# Patient Record
Sex: FEMALE | Race: WHITE | NOT HISPANIC OR LATINO | Employment: FULL TIME | URBAN - METROPOLITAN AREA
[De-identification: names, ages, dates, MRNs, and addresses within clinical notes are randomized per-mention and may not be internally consistent; named-entity substitution may affect disease eponyms.]

---

## 2017-10-11 ENCOUNTER — OFFICE VISIT (OUTPATIENT)
Dept: OBSTETRICS AND GYNECOLOGY | Facility: CLINIC | Age: 22
End: 2017-10-11

## 2017-10-11 VITALS
BODY MASS INDEX: 17.85 KG/M2 | SYSTOLIC BLOOD PRESSURE: 100 MMHG | DIASTOLIC BLOOD PRESSURE: 70 MMHG | HEIGHT: 62 IN | WEIGHT: 97 LBS

## 2017-10-11 DIAGNOSIS — Z01.419 ENCOUNTER FOR GYNECOLOGICAL EXAMINATION WITHOUT ABNORMAL FINDING: Primary | ICD-10-CM

## 2017-10-11 DIAGNOSIS — Z30.41 ENCOUNTER FOR SURVEILLANCE OF CONTRACEPTIVE PILLS: ICD-10-CM

## 2017-10-11 PROCEDURE — 99385 PREV VISIT NEW AGE 18-39: CPT | Performed by: OBSTETRICS & GYNECOLOGY

## 2017-10-11 RX ORDER — SPIRONOLACTONE 50 MG/1
TABLET, FILM COATED ORAL
COMMUNITY
Start: 2017-09-19 | End: 2021-01-13

## 2017-10-11 RX ORDER — CEPHALEXIN 500 MG/1
CAPSULE ORAL
COMMUNITY
Start: 2017-09-18 | End: 2020-07-06

## 2017-10-11 NOTE — PROGRESS NOTES
Subjective     Veronica Welsh is a 22 y.o. female for annual gyn exam    History of Present Illness    22-year-old nulliparous white female presents for her first gynecologic exam.  She is a new patient to this office.  Her menstrual cycles are regular and well controlled with oral contraceptives.  She has been on the birth control pill for approximately 4 years.  She has had some issues with postcoital UTIs and now takes prophylactic antibiotics after intercourse.  She is also noticed some decreased lubrication and libido.  There is no family history of breast cancer.  She has been in good health.      The following portions of the patient's history were reviewed and updated as appropriate: allergies, current medications, past family history, past medical history, past social history, past surgical history and problem list.      Review of Systems   Constitutional: Negative for activity change, appetite change, fatigue and unexpected weight change.   HENT: Negative.    Eyes: Negative.    Respiratory: Negative.    Cardiovascular: Negative.    Gastrointestinal: Negative for abdominal distention, abdominal pain, anal bleeding, constipation, diarrhea, nausea and vomiting.   Endocrine: Negative for cold intolerance, heat intolerance, polydipsia, polyphagia and polyuria.   Genitourinary: Negative for difficulty urinating, dysuria, flank pain, frequency, hematuria and urgency.   Musculoskeletal: Negative.    Skin: Negative.    Allergic/Immunologic: Negative.    Neurological: Negative.    Hematological: Negative.    Psychiatric/Behavioral: Negative.        Objective     Physical Exam   Constitutional: She is oriented to person, place, and time. Vital signs are normal. She appears well-developed and well-nourished. She is cooperative.   HENT:   Head: Normocephalic.   Eyes: Conjunctivae are normal. Pupils are equal, round, and reactive to light.   Neck: Normal range of motion. Neck supple. No tracheal deviation present. No  thyromegaly present.   Cardiovascular: Normal rate, regular rhythm and normal heart sounds.  Exam reveals no gallop and no friction rub.    No murmur heard.  Pulmonary/Chest: Effort normal and breath sounds normal. No respiratory distress. She has no wheezes. Right breast exhibits no inverted nipple, no mass, no nipple discharge, no skin change and no tenderness. Left breast exhibits no inverted nipple, no mass, no nipple discharge, no skin change and no tenderness. Breasts are symmetrical. There is no breast swelling.   Abdominal: Soft. Bowel sounds are normal. She exhibits no distension, no ascites and no mass. There is no hepatosplenomegaly. There is no tenderness. There is no rebound, no guarding and no CVA tenderness. No hernia. Hernia confirmed negative in the right inguinal area and confirmed negative in the left inguinal area.   Genitourinary: Pelvic exam was performed with patient supine. No labial fusion. There is no rash, tenderness, lesion or injury on the right labia. There is no rash, tenderness, lesion or injury on the left labia. Uterus is not deviated, not enlarged, not fixed and not tender. Cervix exhibits no motion tenderness, no discharge and no friability. Right adnexum displays no mass, no tenderness and no fullness. Left adnexum displays no mass, no tenderness and no fullness. No erythema, tenderness or bleeding in the vagina. No foreign body in the vagina. No signs of injury around the vagina. No vaginal discharge found.   Genitourinary Comments: Rectal exam deferred.   Musculoskeletal: Normal range of motion. She exhibits no edema or deformity.   Lymphadenopathy:     She has no cervical adenopathy.        Right: No inguinal adenopathy present.        Left: No inguinal adenopathy present.   Neurological: She is alert and oriented to person, place, and time. She has normal reflexes. No cranial nerve deficit. Coordination normal.   Skin: Skin is warm and dry. No rash noted. No erythema.    Psychiatric: She has a normal mood and affect. Her behavior is normal.         Veronica was seen today for gynecologic exam.    Diagnoses and all orders for this visit:    Encounter for gynecological examination without abnormal finding  -     Pap IG, Ct-Ng, Rfx HPV ASCU - ThinPrep Vial, Cervix    Encounter for surveillance of contraceptive pills  -     norethindrone-ethinyl estradiol (PIRMELLA 1/35) 1-35 MG-MCG per tablet; Take 1 tablet by mouth Daily.    The patient was reassured with her normal exam.  We discussed libido and oral contraceptive pills and I offered a trial of a different pill.  The patient will consider this.  We discussed a healthy diet and regular exercise.  He will continue post coital methods to prevent recurrent urinary tract infections.  Annual exams were recommended.

## 2017-10-16 LAB
C TRACH RRNA CVX QL NAA+PROBE: NEGATIVE
CONV .: NORMAL
CYTOLOGIST CVX/VAG CYTO: NORMAL
CYTOLOGY CVX/VAG DOC THIN PREP: NORMAL
DX ICD CODE: NORMAL
HIV 1 & 2 AB SER-IMP: NORMAL
N GONORRHOEA RRNA CVX QL NAA+PROBE: NEGATIVE
OTHER STN SPEC: NORMAL
PATH REPORT.FINAL DX SPEC: NORMAL
STAT OF ADQ CVX/VAG CYTO-IMP: NORMAL

## 2018-01-31 ENCOUNTER — TREATMENT (OUTPATIENT)
Dept: PHYSICAL THERAPY | Facility: CLINIC | Age: 23
End: 2018-01-31

## 2018-01-31 DIAGNOSIS — Z02.1 PRE-EMPLOYMENT EXAMINATION: Primary | ICD-10-CM

## 2018-01-31 PROCEDURE — PDS: Performed by: PHYSICAL THERAPIST

## 2018-03-02 ENCOUNTER — OFFICE VISIT (OUTPATIENT)
Dept: OBSTETRICS AND GYNECOLOGY | Facility: CLINIC | Age: 23
End: 2018-03-02

## 2018-03-02 VITALS
HEIGHT: 62 IN | SYSTOLIC BLOOD PRESSURE: 100 MMHG | WEIGHT: 95 LBS | BODY MASS INDEX: 17.48 KG/M2 | DIASTOLIC BLOOD PRESSURE: 70 MMHG

## 2018-03-02 DIAGNOSIS — Z30.09 ENCOUNTER FOR OTHER GENERAL COUNSELING OR ADVICE ON CONTRACEPTION: Primary | ICD-10-CM

## 2018-03-02 PROCEDURE — 99213 OFFICE O/P EST LOW 20 MIN: CPT | Performed by: OBSTETRICS & GYNECOLOGY

## 2018-03-02 NOTE — PROGRESS NOTES
Subjective   Veronica Welsh is a 22 y.o. female here for contraceptive counseling     History of Present Illness   22-year-old nulliparous white female currently on oral contraceptives is interested in alternative forms of birth control.  The following portions of the patient's history were reviewed and updated as appropriate: allergies, current medications, past family history, past medical history, past social history, past surgical history and problem list.    Review of Systems   Genitourinary: Negative for menstrual problem, pelvic pain and vaginal bleeding.       Objective   Physical Exam   The patient is alert and conversant and in no acute distress.  Blood pressure was 100/70.  Pulse 72 and regular    Assessment/Plan   Veronica was seen today for gynecologic exam.    Diagnoses and all orders for this visit:    Encounter for other general counseling or advice on contraception     We discussed contraceptive options.  She is most interested in the Nexplanon implant or the NuvaRing.  She is not interested in Depo-Provera or the IUD.  Alternative forms of taking the oral contraceptive pill were also discussed.  Information booklets were given on the Nexplanon and the NuvaRing.  She will call with her choice.  Total visit time 20 minutes with greater than 50% spent in counseling regarding contraception and safe sex practices.

## 2020-06-22 ENCOUNTER — TELEPHONE (OUTPATIENT)
Dept: OBSTETRICS AND GYNECOLOGY | Facility: CLINIC | Age: 25
End: 2020-06-22

## 2020-06-22 NOTE — TELEPHONE ENCOUNTER
Good Afternoon,    Patient is wanting to switch care from Dr. Villaseñor to a different provider. Would you be okay with taking her on as a patient?     Please advise, thank you.

## 2020-06-23 NOTE — TELEPHONE ENCOUNTER
Patient is aware that switching providers is OK, and was put on schedule to be seen my Arline BAIG

## 2020-07-06 ENCOUNTER — OFFICE VISIT (OUTPATIENT)
Dept: OBSTETRICS AND GYNECOLOGY | Facility: CLINIC | Age: 25
End: 2020-07-06

## 2020-07-06 VITALS
HEIGHT: 63 IN | BODY MASS INDEX: 18.25 KG/M2 | WEIGHT: 103 LBS | DIASTOLIC BLOOD PRESSURE: 60 MMHG | SYSTOLIC BLOOD PRESSURE: 100 MMHG

## 2020-07-06 DIAGNOSIS — Z31.69 ENCOUNTER FOR PRECONCEPTION CONSULTATION: ICD-10-CM

## 2020-07-06 DIAGNOSIS — Z01.419 ENCOUNTER FOR ANNUAL ROUTINE GYNECOLOGICAL EXAMINATION: Primary | ICD-10-CM

## 2020-07-06 PROCEDURE — 99395 PREV VISIT EST AGE 18-39: CPT | Performed by: NURSE PRACTITIONER

## 2020-07-06 NOTE — PROGRESS NOTES
GYN Annual Exam     Chief Complaint   Patient presents with   • Gynecologic Exam     Last pap 10/12/17 negative.       HPI    Veronica Welsh is a 25 y.o. female who presents for annual well woman exam.  She currently sexually active. Periods are regular every 35 days, lasting 7 days. Dysmenorrhea:moderate, occurring first 1-2 days of flow. Cyclic symptoms include bloating, moodiness and cramping. No intermenstrual bleeding, spotting, or discharge. Performing SBE:yes    C/o irregularity since stopping OCP, she is getting  in 3 months and considering trying to concieve. She was experiencing vaginal dryness and decreased libido while taking OCP. PCP had switched to a different birth control with no improvement in sx. She stopped taking approx 8 months ago, symptoms have resolved since stopping.      Taking spironolactone to treat acne     This is my first time meeting Veronica Welsh      OB History        0    Para   0    Term   0       0    AB   0    Living   0       SAB   0    TAB   0    Ectopic   0    Molar   0    Multiple   0    Live Births   0                LMP-20  Last Pap--negative  History of abnormal Pap smear: no  History of STD-denies  Family history of uterine, colon or ovarian cancer: no  Family history of breast cancer: no  Mammogram-never  History of abnormal mammogram: no  Gardasil Vaccine: yes    Past Medical History:   Diagnosis Date   • Acne        Past Surgical History:   Procedure Laterality Date   • TONSILLECTOMY           Current Outpatient Medications:   •  norethindrone-ethinyl estradiol (PIRMELLA 1/35) 1-35 MG-MCG per tablet, Take 1 tablet by mouth Daily., Disp: 28 tablet, Rfl: 11  •  spironolactone (ALDACTONE) 50 MG tablet, , Disp: , Rfl:     Allergies   Allergen Reactions   • Amoxicillin Hives       Social History     Tobacco Use   • Smoking status: Never Smoker   • Smokeless tobacco: Never Used   Substance Use Topics   • Alcohol use: No   • Drug use: No  "      Family History   Problem Relation Age of Onset   • No Known Problems Father    • No Known Problems Mother        Review of Systems   Constitutional: Negative for chills, fatigue and fever.   Gastrointestinal: Negative for abdominal distention, abdominal pain, constipation and diarrhea.   Genitourinary: Positive for pelvic pain (mild, new onset). Negative for breast discharge, breast lump, breast pain, dyspareunia, pelvic pressure, vaginal bleeding, vaginal discharge and vaginal pain.   Musculoskeletal: Negative for gait problem.   Neurological: Negative for dizziness and headache.   Psychiatric/Behavioral: Negative for depressed mood.       /60   Ht 160 cm (63\")   Wt 46.7 kg (103 lb)   LMP 06/21/2020   BMI 18.25 kg/m²     Physical Exam   Constitutional: She is oriented to person, place, and time. She appears well-developed and well-nourished.   HENT:   Head: Normocephalic and atraumatic.   Eyes: Pupils are equal, round, and reactive to light. EOM are normal. Right eye exhibits no discharge.   Neck: Normal range of motion. Neck supple. No thyromegaly present.   Cardiovascular: Normal rate, regular rhythm and normal heart sounds.   No murmur heard.  Pulmonary/Chest: Effort normal and breath sounds normal. No respiratory distress. She has no wheezes. Right breast exhibits no inverted nipple, no mass, no nipple discharge, no skin change and no tenderness. Left breast exhibits no inverted nipple, no mass, no nipple discharge, no skin change and no tenderness. No breast swelling, tenderness, discharge or bleeding. Breasts are symmetrical.   Abdominal: Soft. She exhibits no distension and no mass. There is no tenderness. There is no rebound and no guarding. No hernia. Hernia confirmed negative in the right inguinal area and confirmed negative in the left inguinal area.   Genitourinary: Uterus normal. No labial fusion. There is no rash, tenderness, lesion or injury on the right labia. There is no rash, " tenderness, lesion or injury on the left labia. Cervix exhibits no motion tenderness, no discharge and no friability. Right adnexum displays no mass, no tenderness and no fullness. Left adnexum displays no mass, no tenderness and no fullness. No erythema, tenderness or bleeding in the vagina. No foreign body in the vagina. No signs of injury around the vagina. No vaginal discharge found.   Musculoskeletal: Normal range of motion. She exhibits no edema or deformity.   Lymphadenopathy:     She has no cervical adenopathy. No inguinal adenopathy noted on the right or left side.   Neurological: She is alert and oriented to person, place, and time. No cranial nerve deficit. Coordination normal.   Skin: Skin is warm and dry. No erythema.   Psychiatric: She has a normal mood and affect. Her behavior is normal. Judgment and thought content normal.   Nursing note and vitals reviewed.         Assessment     1. Annual Gyn Exam  2. Preconception counseling     Plan   1. Well woman exam: Pap collected Yes. Recommend MVI daily.    2. Contraception: Declines  3. STD: Enc condoms. Desires STD screen today- Yes. NuSwab  4. Smoking status: nonsmoker  5. Patient's Body mass index is 18.25 kg/m². BMI is within normal parameters. No follow-up required..  6.  Encouraged PNV and folic acid daily, d/c spironolactone when begins to try to conceive.    7. Encouraged annual mammogram screening starting at age 40. Instructed on how to perform SBE. Encouraged breast health self awareness.  8. Encouraged 150 minutes of exercise per week if not medially contraindicated.     Follow Up one year or PRN    SAFIA Amador  7/6/2020  10:49

## 2020-07-09 LAB
CONV .: NORMAL
CYTOLOGIST CVX/VAG CYTO: NORMAL
CYTOLOGY CVX/VAG DOC CYTO: NORMAL
CYTOLOGY CVX/VAG DOC THIN PREP: NORMAL
DX ICD CODE: NORMAL
HIV 1 & 2 AB SER-IMP: NORMAL
Lab: NORMAL
OTHER STN SPEC: NORMAL
STAT OF ADQ CVX/VAG CYTO-IMP: NORMAL

## 2020-07-14 LAB
A VAGINAE DNA VAG QL NAA+PROBE: NORMAL SCORE
BVAB2 DNA VAG QL NAA+PROBE: NORMAL SCORE
C ALBICANS DNA VAG QL NAA+PROBE: NEGATIVE
C GLABRATA DNA VAG QL NAA+PROBE: NEGATIVE
C TRACH DNA VAG QL NAA+PROBE: NEGATIVE
MEGA1 DNA VAG QL NAA+PROBE: NORMAL SCORE
N GONORRHOEA DNA VAG QL NAA+PROBE: NEGATIVE
T VAGINALIS DNA VAG QL NAA+PROBE: NEGATIVE

## 2021-01-13 ENCOUNTER — OFFICE VISIT (OUTPATIENT)
Dept: FAMILY MEDICINE CLINIC | Facility: CLINIC | Age: 26
End: 2021-01-13

## 2021-01-13 VITALS
SYSTOLIC BLOOD PRESSURE: 123 MMHG | OXYGEN SATURATION: 100 % | HEIGHT: 63 IN | HEART RATE: 98 BPM | DIASTOLIC BLOOD PRESSURE: 84 MMHG | BODY MASS INDEX: 19.21 KG/M2 | WEIGHT: 108.4 LBS | TEMPERATURE: 97.8 F

## 2021-01-13 DIAGNOSIS — N93.9 ABNORMAL UTERINE BLEEDING: Primary | ICD-10-CM

## 2021-01-13 LAB
B-HCG UR QL: NEGATIVE
INTERNAL NEGATIVE CONTROL: NEGATIVE
INTERNAL POSITIVE CONTROL: NORMAL
Lab: NORMAL

## 2021-01-13 PROCEDURE — 99213 OFFICE O/P EST LOW 20 MIN: CPT | Performed by: NURSE PRACTITIONER

## 2021-01-13 PROCEDURE — 81025 URINE PREGNANCY TEST: CPT | Performed by: NURSE PRACTITIONER

## 2021-01-13 NOTE — PROGRESS NOTES
"Chief Complaint  Establish Care (GYN does pap smear) and Menstrual Problem  I have seen this patient at [facility] ([facility]   Subjective          Veronica Welsh presents to [facility] for   History of Present Illness  Abnormal uterine bleeding: Patient complains of irregular menstrual cycles starting more than 3 months ago. Patient stopped combined oral contraceptive due to side effect of low libido. Patient is currently sexually active.  Objective   Vital Signs:   /84   Pulse 98   Temp 97.8 °F (36.6 °C) (Temporal)   Ht 160 cm (63\")   Wt 49.2 kg (108 lb 6.4 oz)   SpO2 100%   BMI 19.20 kg/m²     Physical Exam  Vitals signs and nursing note reviewed.   Constitutional:       Appearance: Normal appearance.   Cardiovascular:      Rate and Rhythm: Normal rate and regular rhythm.   Pulmonary:      Effort: Pulmonary effort is normal.      Breath sounds: Normal breath sounds.   Abdominal:      General: Bowel sounds are normal.      Palpations: Abdomen is soft.   Neurological:      Mental Status: She is alert and oriented to person, place, and time.   Psychiatric:         Mood and Affect: Mood normal.        Result Review :   The following data was reviewed by: SAFIA Garcia on 01/13/2021:  Urine pregnancy test           Assessment and Plan    Problem List Items Addressed This Visit     None      Visit Diagnoses     Abnormal uterine bleeding    -  Primary    - If labs are normal, then may consider transvaginal US for further evaluation.     Relevant Orders    POCT pregnancy, urine (Completed)    TSH Rfx On Abnormal To Free T4    Prolactin        I spent 15 minutes caring for Veronica on this date of service. This time includes time spent by me in the following activities:reviewing tests, performing a medically appropriate examination and/or evaluation , counseling and educating the patient/family/caregiver, ordering medications, tests, or procedures " and documenting information in the medical record  Follow Up   No follow-ups on file.  Patient was given instructions and counseling regarding her condition or for health maintenance advice. Please see specific information pulled into the AVS if appropriate.

## 2021-01-14 ENCOUNTER — TELEPHONE (OUTPATIENT)
Dept: FAMILY MEDICINE CLINIC | Facility: CLINIC | Age: 26
End: 2021-01-14

## 2021-01-14 DIAGNOSIS — N93.9 ABNORMAL UTERINE BLEEDING: Primary | ICD-10-CM

## 2021-01-14 LAB
PROLACTIN SERPL-MCNC: 21.8 NG/ML (ref 4.8–23.3)
TSH SERPL DL<=0.005 MIU/L-ACNC: 0.96 UIU/ML (ref 0.27–4.2)

## 2021-01-14 NOTE — TELEPHONE ENCOUNTER
TSH and prolactin are normal so would recommend transvaginal US at this time.    Patient aware of results and recommendations.

## 2021-01-28 DIAGNOSIS — N93.9 ABNORMAL UTERINE BLEEDING: Primary | ICD-10-CM

## 2021-01-29 ENCOUNTER — HOSPITAL ENCOUNTER (OUTPATIENT)
Dept: ULTRASOUND IMAGING | Facility: HOSPITAL | Age: 26
End: 2021-01-29

## 2021-01-29 ENCOUNTER — HOSPITAL ENCOUNTER (OUTPATIENT)
Dept: ULTRASOUND IMAGING | Facility: HOSPITAL | Age: 26
Discharge: HOME | End: 2021-01-29
Admitting: NURSE PRACTITIONER

## 2021-01-29 ENCOUNTER — TELEPHONE (OUTPATIENT)
Dept: FAMILY MEDICINE CLINIC | Facility: CLINIC | Age: 26
End: 2021-01-29

## 2021-01-29 DIAGNOSIS — N93.9 ABNORMAL UTERINE BLEEDING: ICD-10-CM

## 2021-01-29 DIAGNOSIS — N93.9 ABNORMAL UTERINE BLEEDING: Primary | ICD-10-CM

## 2021-01-29 PROCEDURE — 76856 US EXAM PELVIC COMPLETE: CPT

## 2021-01-29 PROCEDURE — 76830 TRANSVAGINAL US NON-OB: CPT

## 2021-01-29 NOTE — TELEPHONE ENCOUNTER
[facility] called and ask that you also put in the following order  U/S Pelvis Complete.

## 2021-02-01 ENCOUNTER — TELEPHONE (OUTPATIENT)
Dept: FAMILY MEDICINE CLINIC | Facility: CLINIC | Age: 26
End: 2021-02-01

## 2021-02-01 NOTE — TELEPHONE ENCOUNTER
Patient wants to know if there is any additional testing or is this it, the problem hasn't corrected & she wants to know if this is just something she has to live with

## 2021-02-01 NOTE — TELEPHONE ENCOUNTER
----- Message from SAFIA Garcia sent at 2/1/2021  8:55 AM EST -----  Please inform patient that pelvic US is normal.        LMTCB    ** HUB MAY RELAY MESSAGE**

## 2021-02-02 NOTE — TELEPHONE ENCOUNTER
Patient aware of normal US results. Patient does not wish to restart birth control because she is trying to conceive. Patient will follow-up with gynecology as needed.

## 2021-04-12 ENCOUNTER — OFFICE VISIT (OUTPATIENT)
Dept: FAMILY MEDICINE CLINIC | Facility: CLINIC | Age: 26
End: 2021-04-12

## 2021-04-12 VITALS
HEIGHT: 63 IN | DIASTOLIC BLOOD PRESSURE: 82 MMHG | HEART RATE: 96 BPM | SYSTOLIC BLOOD PRESSURE: 110 MMHG | BODY MASS INDEX: 18.89 KG/M2 | OXYGEN SATURATION: 97 % | WEIGHT: 106.6 LBS | TEMPERATURE: 98.2 F

## 2021-04-12 DIAGNOSIS — R19.7 DIARRHEA OF PRESUMED INFECTIOUS ORIGIN: Primary | ICD-10-CM

## 2021-04-12 PROBLEM — J30.9 ALLERGIC RHINITIS: Status: ACTIVE | Noted: 2018-02-20

## 2021-04-12 PROCEDURE — 99213 OFFICE O/P EST LOW 20 MIN: CPT | Performed by: FAMILY MEDICINE

## 2021-04-12 RX ORDER — OMEPRAZOLE 40 MG/1
40 CAPSULE, DELAYED RELEASE ORAL DAILY
Qty: 30 CAPSULE | Refills: 0 | Status: SHIPPED | OUTPATIENT
Start: 2021-04-12 | End: 2021-05-18

## 2021-04-12 RX ORDER — ONDANSETRON 4 MG/1
4 TABLET, FILM COATED ORAL EVERY 8 HOURS PRN
Qty: 30 TABLET | Refills: 0 | Status: SHIPPED | OUTPATIENT
Start: 2021-04-12 | End: 2021-05-18

## 2021-04-12 NOTE — PROGRESS NOTES
"Subjective   Veronica Welsh is a 25 y.o. female.     Chief Complaint   Patient presents with   • Diarrhea     since Friday   • Heartburn       History of Present Illness   Patient has had diarrhea and heartburn for 3 days.  Diarrhea has been nonbloody.  She had her first Covid shot 1 week prior to this. Has had heartburn before. No emesis but some nausea. Eating makes her nauseated. Spicy foods make it worse. Not taking any otc meds.     The following portions of the patient's history were reviewed and updated as appropriate: allergies, current medications, past family history, past medical history, past social history, past surgical history and problem list.    Past Medical History:   Diagnosis Date   • Acne        Past Surgical History:   Procedure Laterality Date   • TONSILLECTOMY         Family History   Problem Relation Age of Onset   • No Known Problems Father    • Hyperlipidemia Mother    • Hyperlipidemia Maternal Grandfather        Social History     Socioeconomic History   • Marital status: Single     Spouse name: Not on file   • Number of children: Not on file   • Years of education: Not on file   • Highest education level: Not on file   Tobacco Use   • Smoking status: Never Smoker   • Smokeless tobacco: Never Used   Substance and Sexual Activity   • Alcohol use: No   • Drug use: No   • Sexual activity: Yes     Birth control/protection: OCP       Review of Systems   Constitutional: Negative for fever.       Objective   Visit Vitals  /82 (BP Location: Right arm, Patient Position: Sitting)   Pulse 96   Temp 98.2 °F (36.8 °C)   Ht 160 cm (62.99\")   Wt 48.4 kg (106 lb 9.6 oz)   SpO2 97%   BMI 18.89 kg/m²     Body mass index is 18.89 kg/m².  Physical Exam  Constitutional:       Appearance: Normal appearance. She is well-developed.   Cardiovascular:      Rate and Rhythm: Normal rate and regular rhythm.      Heart sounds: Normal heart sounds.   Pulmonary:      Effort: Pulmonary effort is normal.      Breath " sounds: Normal breath sounds.   Abdominal:      General: Abdomen is flat. Bowel sounds are normal. There is no distension.      Palpations: There is no mass.      Tenderness: There is no abdominal tenderness. There is no guarding or rebound.   Musculoskeletal:         General: No swelling. Normal range of motion.   Skin:     General: Skin is warm and dry.      Findings: No rash.   Neurological:      General: No focal deficit present.      Mental Status: She is alert and oriented to person, place, and time.   Psychiatric:         Mood and Affect: Mood normal.         Behavior: Behavior normal.           Assessment/Plan   Diagnoses and all orders for this visit:    1. Diarrhea of presumed infectious origin (Primary)  -     CBC & Differential  -     Comprehensive Metabolic Panel  -     COVID-19,[facility] ROUTINE, NP/OP SWAB IN TRANSPORT MEDIA OR ESWAB 72 HR TAT - Swab, Nasopharynx  -     TSH Rfx On Abnormal To Free T4  -     omeprazole (priLOSEC) 40 MG capsule; Take 1 capsule by mouth Daily.  Dispense: 30 capsule; Refill: 0  -     ondansetron (Zofran) 4 MG tablet; Take 1 tablet by mouth Every 8 (Eight) Hours As Needed for Nausea or Vomiting.  Dispense: 30 tablet; Refill: 0        Rest, fluids and follow up if worse or no better.

## 2021-04-13 LAB
ALBUMIN SERPL-MCNC: 5.2 G/DL (ref 3.5–5.2)
ALBUMIN/GLOB SERPL: 2.1 G/DL
ALP SERPL-CCNC: 93 U/L (ref 39–117)
ALT SERPL-CCNC: 18 U/L (ref 1–33)
AST SERPL-CCNC: 21 U/L (ref 1–32)
BASOPHILS # BLD AUTO: 0.06 10*3/MM3 (ref 0–0.2)
BASOPHILS NFR BLD AUTO: 0.6 % (ref 0–1.5)
BILIRUB SERPL-MCNC: 0.6 MG/DL (ref 0–1.2)
BUN SERPL-MCNC: 11 MG/DL (ref 6–20)
BUN/CREAT SERPL: 14.5 (ref 7–25)
CALCIUM SERPL-MCNC: 10.5 MG/DL (ref 8.6–10.5)
CHLORIDE SERPL-SCNC: 103 MMOL/L (ref 98–107)
CO2 SERPL-SCNC: 26.7 MMOL/L (ref 22–29)
CREAT SERPL-MCNC: 0.76 MG/DL (ref 0.57–1)
EOSINOPHIL # BLD AUTO: 0.03 10*3/MM3 (ref 0–0.4)
EOSINOPHIL NFR BLD AUTO: 0.3 % (ref 0.3–6.2)
ERYTHROCYTE [DISTWIDTH] IN BLOOD BY AUTOMATED COUNT: 12.7 % (ref 12.3–15.4)
GLOBULIN SER CALC-MCNC: 2.5 GM/DL
GLUCOSE SERPL-MCNC: 89 MG/DL (ref 65–99)
HCT VFR BLD AUTO: 41.8 % (ref 34–46.6)
HGB BLD-MCNC: 13.8 G/DL (ref 12–15.9)
IMM GRANULOCYTES # BLD AUTO: 0.03 10*3/MM3 (ref 0–0.05)
IMM GRANULOCYTES NFR BLD AUTO: 0.3 % (ref 0–0.5)
LABCORP SARS-COV-2, NAA 2 DAY TAT: NORMAL
LYMPHOCYTES # BLD AUTO: 1.93 10*3/MM3 (ref 0.7–3.1)
LYMPHOCYTES NFR BLD AUTO: 18.7 % (ref 19.6–45.3)
MCH RBC QN AUTO: 28.3 PG (ref 26.6–33)
MCHC RBC AUTO-ENTMCNC: 33 G/DL (ref 31.5–35.7)
MCV RBC AUTO: 85.8 FL (ref 79–97)
MONOCYTES # BLD AUTO: 0.68 10*3/MM3 (ref 0.1–0.9)
MONOCYTES NFR BLD AUTO: 6.6 % (ref 5–12)
NEUTROPHILS # BLD AUTO: 7.57 10*3/MM3 (ref 1.7–7)
NEUTROPHILS NFR BLD AUTO: 73.5 % (ref 42.7–76)
NRBC BLD AUTO-RTO: 0 /100 WBC (ref 0–0.2)
PLATELET # BLD AUTO: 346 10*3/MM3 (ref 140–450)
POTASSIUM SERPL-SCNC: 4.1 MMOL/L (ref 3.5–5.2)
PROT SERPL-MCNC: 7.7 G/DL (ref 6–8.5)
RBC # BLD AUTO: 4.87 10*6/MM3 (ref 3.77–5.28)
SARS-COV-2 RNA RESP QL NAA+PROBE: NOT DETECTED
SODIUM SERPL-SCNC: 140 MMOL/L (ref 136–145)
TSH SERPL DL<=0.005 MIU/L-ACNC: 0.69 UIU/ML (ref 0.27–4.2)
WBC # BLD AUTO: 10.3 10*3/MM3 (ref 3.4–10.8)

## 2021-04-14 ENCOUNTER — TELEPHONE (OUTPATIENT)
Dept: FAMILY MEDICINE CLINIC | Facility: CLINIC | Age: 26
End: 2021-04-14

## 2021-04-14 NOTE — TELEPHONE ENCOUNTER
----- Message from Amber Peterson MD sent at 4/14/2021  8:41 AM EDT -----  Please let her know that her Covid test was negative.

## 2021-04-19 ENCOUNTER — TELEPHONE (OUTPATIENT)
Dept: FAMILY MEDICINE CLINIC | Facility: CLINIC | Age: 26
End: 2021-04-19

## 2021-04-19 NOTE — TELEPHONE ENCOUNTER
The following patient called and stated that she did receive her covid results however she did not receive the results of her blood work.

## 2021-04-19 NOTE — TELEPHONE ENCOUNTER
The following patient called and is requesting to be started on birth control pills. Pt states that she discuss this with you a couple of visits ago.

## 2021-04-20 RX ORDER — NORETHINDRONE AND ETHINYL ESTRADIOL 1 MG-35MCG
1 KIT ORAL DAILY
Qty: 84 TABLET | Refills: 3 | Status: SHIPPED | OUTPATIENT
Start: 2021-04-20 | End: 2022-09-07

## 2021-06-07 ENCOUNTER — OFFICE VISIT (OUTPATIENT)
Dept: FAMILY MEDICINE CLINIC | Facility: CLINIC | Age: 26
End: 2021-06-07

## 2021-06-07 VITALS
WEIGHT: 104 LBS | SYSTOLIC BLOOD PRESSURE: 123 MMHG | OXYGEN SATURATION: 96 % | TEMPERATURE: 97.8 F | HEART RATE: 88 BPM | HEIGHT: 63 IN | BODY MASS INDEX: 18.43 KG/M2 | DIASTOLIC BLOOD PRESSURE: 77 MMHG

## 2021-06-07 DIAGNOSIS — Z20.2 STD EXPOSURE: ICD-10-CM

## 2021-06-07 DIAGNOSIS — N30.01 ACUTE CYSTITIS WITH HEMATURIA: Primary | ICD-10-CM

## 2021-06-07 LAB
BILIRUB BLD-MCNC: NEGATIVE MG/DL
CLARITY, POC: CLEAR
COLOR UR: ABNORMAL
GLUCOSE UR STRIP-MCNC: NEGATIVE MG/DL
KETONES UR QL: ABNORMAL
LEUKOCYTE EST, POC: ABNORMAL
NITRITE UR-MCNC: NEGATIVE MG/ML
PH UR: 8 [PH] (ref 5–8)
PROT UR STRIP-MCNC: ABNORMAL MG/DL
RBC # UR STRIP: ABNORMAL /UL
SP GR UR: 1.01 (ref 1–1.03)
UROBILINOGEN UR QL: NORMAL

## 2021-06-07 PROCEDURE — 99213 OFFICE O/P EST LOW 20 MIN: CPT | Performed by: FAMILY MEDICINE

## 2021-06-07 RX ORDER — SULFAMETHOXAZOLE AND TRIMETHOPRIM 800; 160 MG/1; MG/1
1 TABLET ORAL 2 TIMES DAILY
Qty: 10 TABLET | Refills: 0 | Status: SHIPPED | OUTPATIENT
Start: 2021-06-07 | End: 2021-08-26

## 2021-06-07 NOTE — PROGRESS NOTES
Subjective   Veronica Welsh is a 26 y.o. female.     Chief Complaint   Patient presents with   • urgency to urinate     x 1 day   • discomfort and burning when urinating     x 1 day       History of Present Illness     Patient is complaining on pain with urination and frequency of urination and suprapubic pain.  She has a history of recurrent UTIs.    The following portions of the patient's history were reviewed and updated as appropriate: allergies, current medications, past family history, past medical history, past social history, past surgical history and problem list.    Past Medical History:   Diagnosis Date   • Acne        Past Surgical History:   Procedure Laterality Date   • TONSILLECTOMY         Family History   Problem Relation Age of Onset   • No Known Problems Father    • Hyperlipidemia Mother    • Hyperlipidemia Maternal Grandfather        Social History     Socioeconomic History   • Marital status: Single     Spouse name: Not on file   • Number of children: Not on file   • Years of education: Not on file   • Highest education level: Not on file   Tobacco Use   • Smoking status: Never Smoker   • Smokeless tobacco: Never Used   Substance and Sexual Activity   • Alcohol use: No   • Drug use: No   • Sexual activity: Yes     Birth control/protection: OCP       Current Outpatient Medications on File Prior to Visit   Medication Sig Dispense Refill   • norethindrone-ethinyl estradiol (Pirmella 1/35) 1-35 MG-MCG per tablet Take 1 tablet by mouth Daily. 84 tablet 3   • [DISCONTINUED] azithromycin (ZITHROMAX) 250 MG tablet 2 tablets today, then 1 tablet daily 6 tablet 0   • [DISCONTINUED] sulfamethoxazole-trimethoprim (Bactrim DS) 800-160 MG per tablet Take 1 tablet by mouth 2 (Two) Times a Day. 14 tablet 0     No current facility-administered medications on file prior to visit.       Review of Systems   Constitutional: Negative.        Recent Results (from the past 4704 hour(s))   POCT pregnancy, urine     Collection Time: 01/13/21 11:31 AM    Specimen: Urine   Result Value Ref Range    HCG, Urine, QL Negative Negative    Lot Number [id number]     Internal Positive Control Not Performed     Internal Negative Control Negative    TSH Rfx On Abnormal To Free T4    Collection Time: 01/13/21 12:01 PM    Specimen: Blood   Result Value Ref Range    TSH 0.961 0.270 - 4.200 uIU/mL   Prolactin    Collection Time: 01/13/21 12:01 PM    Specimen: Blood   Result Value Ref Range    Prolactin 21.8 4.8 - 23.3 ng/mL   CBC & Differential    Collection Time: 04/12/21  1:29 PM    Specimen: Blood   Result Value Ref Range    WBC 10.30 3.40 - 10.80 10*3/mm3    RBC 4.87 3.77 - 5.28 10*6/mm3    Hemoglobin 13.8 12.0 - 15.9 g/dL    Hematocrit 41.8 34.0 - 46.6 %    MCV 85.8 79.0 - 97.0 fL    MCH 28.3 26.6 - 33.0 pg    MCHC 33.0 31.5 - 35.7 g/dL    RDW 12.7 12.3 - 15.4 %    Platelets 346 140 - 450 10*3/mm3    Neutrophil Rel % 73.5 42.7 - 76.0 %    Lymphocyte Rel % 18.7 (L) 19.6 - 45.3 %    Monocyte Rel % 6.6 5.0 - 12.0 %    Eosinophil Rel % 0.3 0.3 - 6.2 %    Basophil Rel % 0.6 0.0 - 1.5 %    Neutrophils Absolute 7.57 (H) 1.70 - 7.00 10*3/mm3    Lymphocytes Absolute 1.93 0.70 - 3.10 10*3/mm3    Monocytes Absolute 0.68 0.10 - 0.90 10*3/mm3    Eosinophils Absolute 0.03 0.00 - 0.40 10*3/mm3    Basophils Absolute 0.06 0.00 - 0.20 10*3/mm3    Immature Granulocyte Rel % 0.3 0.0 - 0.5 %    Immature Grans Absolute 0.03 0.00 - 0.05 10*3/mm3    nRBC 0.0 0.0 - 0.2 /100 WBC   Comprehensive Metabolic Panel    Collection Time: 04/12/21  1:29 PM    Specimen: Blood   Result Value Ref Range    Glucose 89 65 - 99 mg/dL    BUN 11 6 - 20 mg/dL    Creatinine 0.76 0.57 - 1.00 mg/dL    eGFR Non African Am 93 >60 mL/min/1.73    eGFR African Am 113 >60 mL/min/1.73    BUN/Creatinine Ratio 14.5 7.0 - 25.0    Sodium 140 136 - 145 mmol/L    Potassium 4.1 3.5 - 5.2 mmol/L    Chloride 103 98 - 107 mmol/L    Total CO2 26.7 22.0 - 29.0 mmol/L    Calcium 10.5 8.6 - 10.5 mg/dL     Total Protein 7.7 6.0 - 8.5 g/dL    Albumin 5.20 3.50 - 5.20 g/dL    Globulin 2.5 gm/dL    A/G Ratio 2.1 g/dL    Total Bilirubin 0.6 0.0 - 1.2 mg/dL    Alkaline Phosphatase 93 39 - 117 U/L    AST (SGOT) 21 1 - 32 U/L    ALT (SGPT) 18 1 - 33 U/L   TSH Rfx On Abnormal To Free T4    Collection Time: 04/12/21  1:29 PM    Specimen: Blood   Result Value Ref Range    TSH 0.692 0.270 - 4.200 uIU/mL   COVID-19,[facility] ROUTINE, NP/OP SWAB IN TRANSPORT MEDIA OR ESWAB 72 HR TAT - Swab, Nasopharynx    Collection Time: 04/12/21  3:09 PM    Specimen: Nasopharynx; Swab   Result Value Ref Range    SARS-CoV-2, [facility]INE Not Detected Not Detected   SARS-CoV-2, CHRISTINE 2 DAY TAT - ,    Collection Time: 04/12/21  3:09 PM   Result Value Ref Range    [facility] SARS-COV-2, CHRISTINE 2 DAY TAT Performed    POC Urinalysis Dipstick, Multipro (Automated dipstick)    Collection Time: 05/18/21 12:49 PM    Specimen: Urine   Result Value Ref Range    Color Red (A) Yellow, Straw, Dark Yellow, Anabel    Clarity, UA Cloudy (A) Clear    Glucose,  mg/dL (A) Negative, 1000 mg/dL (3+) mg/dL    Bilirubin Moderate (2+) (A) Negative    Ketones, UA 15 mg/dL (A) Negative    Specific Gravity  1.010 1.005 - 1.030    Blood, UA Large (A) Negative    pH, Urine 5.0 5.0 - 8.0    Protein,  mg/dL (A) Negative mg/dL    Urobilinogen, UA 1 E.U./dL  (A) Normal    Nitrite, UA Positive (A) Negative    Leukocytes Large (3+) (A) Negative   Urine Culture - Urine, Urine, Clean Catch    Collection Time: 05/18/21 12:51 PM    Specimen: Urine, Clean Catch   Result Value Ref Range    Urine Culture Final report (A)     Result 1 Escherichia coli (A)     Result 2 Comment (A)     Susceptibility Testing Comment    POCT urinalysis dipstick, automated    Collection Time: 06/07/21  2:10 PM    Specimen: Urine   Result Value Ref Range    Color Straw Yellow, Straw, Dark Yellow, Anabel    Clarity, UA Clear Clear    Specific Gravity  1.015 1.005 - 1.030    pH, Urine 8.0 5.0 - 8.0    Leukocytes Trace  "(A) Negative    Nitrite, UA Negative Negative    Protein, POC Trace (A) Negative mg/dL    Glucose, UA Negative Negative, 1000 mg/dL (3+) mg/dL    Ketones, UA Trace (A) Negative    Urobilinogen, UA Normal Normal    Bilirubin Negative Negative    Blood, UA 3+ (A) Negative     Objective   Vitals:    06/07/21 1403   BP: 123/77   Pulse: 88   Temp: 97.8 °F (36.6 °C)   SpO2: 96%   Weight: 47.2 kg (104 lb)   Height: 160 cm (62.99\")     Body mass index is 18.43 kg/m².  Physical Exam  Vitals and nursing note reviewed.   Constitutional:       General: She is not in acute distress.     Appearance: She is well-developed. She is not diaphoretic.   Cardiovascular:      Rate and Rhythm: Normal rate and regular rhythm.   Pulmonary:      Effort: Pulmonary effort is normal. No respiratory distress.      Breath sounds: Normal breath sounds. No wheezing.   Abdominal:      Comments: S/p pain, no CVA pain           Diagnoses and all orders for this visit:    1. Acute cystitis with hematuria (Primary)  -     POCT urinalysis dipstick, automated  -     sulfamethoxazole-trimethoprim (Bactrim DS) 800-160 MG per tablet; Take 1 tablet by mouth 2 (Two) Times a Day.  Dispense: 10 tablet; Refill: 0    2. STD exposure  -     RPR  -     HSV 1 & 2 - Specific Antibody, IgG  -     HIV-1 / O / 2 Ag / Antibody 4th Generation  -     Hepatitis B & C Profile  -     Chlamydia trachomatis, Neisseria gonorrhoeae, Trichomonas vaginalis, PCR - Urine, Urine, Clean Catch    Return if symptoms worsen or fail to improve.            "

## 2021-06-09 LAB
C TRACH RRNA SPEC QL NAA+PROBE: NEGATIVE
HBV CORE AB SERPL QL IA: NEGATIVE
HBV CORE IGM SERPL QL IA: NEGATIVE
HBV E AB SERPL QL IA: NEGATIVE
HBV E AG SERPL QL IA: NEGATIVE
HBV SURFACE AB SER QL: REACTIVE
HBV SURFACE AG SERPL QL IA: NEGATIVE
HCV AB S/CO SERPL IA: <0.1 S/CO RATIO (ref 0–0.9)
HIV 1+2 AB+HIV1 P24 AG SERPL QL IA: NON REACTIVE
HSV1 IGG SER IA-ACNC: <0.91 INDEX (ref 0–0.9)
HSV2 IGG SER IA-ACNC: <0.91 INDEX (ref 0–0.9)
LABORATORY COMMENT REPORT: NORMAL
N GONORRHOEA RRNA SPEC QL NAA+PROBE: NEGATIVE
RPR SER QL: NORMAL
T VAGINALIS DNA SPEC QL NAA+PROBE: NEGATIVE

## 2021-08-26 ENCOUNTER — OFFICE VISIT (OUTPATIENT)
Dept: FAMILY MEDICINE CLINIC | Facility: CLINIC | Age: 26
End: 2021-08-26

## 2021-08-26 ENCOUNTER — TELEPHONE (OUTPATIENT)
Dept: FAMILY MEDICINE CLINIC | Facility: CLINIC | Age: 26
End: 2021-08-26

## 2021-08-26 VITALS
HEART RATE: 88 BPM | BODY MASS INDEX: 18.29 KG/M2 | WEIGHT: 103.2 LBS | OXYGEN SATURATION: 98 % | TEMPERATURE: 97.3 F | SYSTOLIC BLOOD PRESSURE: 118 MMHG | DIASTOLIC BLOOD PRESSURE: 82 MMHG | HEIGHT: 63 IN

## 2021-08-26 DIAGNOSIS — R35.0 FREQUENCY OF URINATION: ICD-10-CM

## 2021-08-26 DIAGNOSIS — Z00.00 WELL FEMALE EXAM WITHOUT GYNECOLOGICAL EXAM: Primary | ICD-10-CM

## 2021-08-26 LAB
B-HCG UR QL: NEGATIVE
BILIRUB BLD-MCNC: NEGATIVE MG/DL
CLARITY, POC: CLEAR
COLOR UR: YELLOW
GLUCOSE UR STRIP-MCNC: NEGATIVE MG/DL
INTERNAL NEGATIVE CONTROL: NORMAL
INTERNAL POSITIVE CONTROL: NORMAL
KETONES UR QL: NEGATIVE
LEUKOCYTE EST, POC: NEGATIVE
Lab: NORMAL
NITRITE UR-MCNC: NEGATIVE MG/ML
PH UR: 6 [PH] (ref 5–8)
PROT UR STRIP-MCNC: NEGATIVE MG/DL
RBC # UR STRIP: NEGATIVE /UL
SP GR UR: 1.01 (ref 1–1.03)
UROBILINOGEN UR QL: NORMAL

## 2021-08-26 PROCEDURE — 81003 URINALYSIS AUTO W/O SCOPE: CPT | Performed by: NURSE PRACTITIONER

## 2021-08-26 PROCEDURE — 81025 URINE PREGNANCY TEST: CPT | Performed by: NURSE PRACTITIONER

## 2021-08-26 PROCEDURE — 99395 PREV VISIT EST AGE 18-39: CPT | Performed by: NURSE PRACTITIONER

## 2021-08-26 RX ORDER — SPIRONOLACTONE 50 MG/1
50 TABLET, FILM COATED ORAL DAILY
COMMUNITY
Start: 2021-07-19 | End: 2022-09-07

## 2021-08-26 NOTE — TELEPHONE ENCOUNTER
Called, unable to leave message due to voice mail full. Will try again later.      **HUB** may give message should patient call back.      ----- Message from SAFIA Garcia sent at 8/26/2021  3:28 PM EDT -----  Please inform patient that urinalysis is negative for infection so will refer to urology at this time.

## 2022-09-07 ENCOUNTER — OFFICE VISIT (OUTPATIENT)
Dept: FAMILY MEDICINE CLINIC | Facility: CLINIC | Age: 27
End: 2022-09-07

## 2022-09-07 VITALS
OXYGEN SATURATION: 98 % | BODY MASS INDEX: 20.48 KG/M2 | WEIGHT: 115.6 LBS | DIASTOLIC BLOOD PRESSURE: 80 MMHG | TEMPERATURE: 98.2 F | SYSTOLIC BLOOD PRESSURE: 102 MMHG | HEART RATE: 62 BPM

## 2022-09-07 DIAGNOSIS — Z00.00 WELL FEMALE EXAM WITHOUT GYNECOLOGICAL EXAM: Primary | ICD-10-CM

## 2022-09-07 DIAGNOSIS — N64.9 LESION OF LEFT NIPPLE: ICD-10-CM

## 2022-09-07 DIAGNOSIS — Z23 NEED FOR HPV VACCINATION: ICD-10-CM

## 2022-09-07 DIAGNOSIS — Z23 NEED FOR PNEUMOCOCCAL VACCINATION: ICD-10-CM

## 2022-09-07 PROCEDURE — 99395 PREV VISIT EST AGE 18-39: CPT | Performed by: NURSE PRACTITIONER

## 2022-09-07 PROCEDURE — 90677 PCV20 VACCINE IM: CPT | Performed by: NURSE PRACTITIONER

## 2022-09-07 PROCEDURE — 90471 IMMUNIZATION ADMIN: CPT | Performed by: NURSE PRACTITIONER

## 2022-09-07 PROCEDURE — 3008F BODY MASS INDEX DOCD: CPT | Performed by: NURSE PRACTITIONER

## 2022-09-07 PROCEDURE — 2014F MENTAL STATUS ASSESS: CPT | Performed by: NURSE PRACTITIONER

## 2022-09-07 PROCEDURE — 90651 9VHPV VACCINE 2/3 DOSE IM: CPT | Performed by: NURSE PRACTITIONER

## 2022-09-07 NOTE — PROGRESS NOTES
Subjective   Veronica Welsh is a 27 y.o. female.     Chief Complaint   Patient presents with   • bump on nipple on left breast for past month, no pain   • Annual Exam       History of Present Illness   The patient is being seen for a health maintenance evaluation.  The last health maintenance visit was last year.  Social history: Household members include boyfriend.  She is unmarried.  Work status: Full-time.  The patient has never smoked cigarettes.  She reports occasional alcohol use.  She has never used illicit drugs.  General health: The patient's health is described as good.  She has regular dental visits.  The patient brushes 2 times a day, does not floss and reports her last dental visit was less than a year ago.  She denies vision problems.  Vision care includes no need for vision correction and no recent eye exam.  She denies hearing loss.  Immunization status: Pneumococcal, Tdap and HPV vaccines needed.  Lifestyle: She does not exercise regularly.  Reproductive health: She is sexually active.  Screening: A normal Pap was performed on 7/6/2020.    The following portions of the patient's history were reviewed and updated as appropriate: allergies, current medications, past family history, past medical history, past social history, past surgical history and problem list.    Past Medical History:   Diagnosis Date   • Acne        Past Surgical History:   Procedure Laterality Date   • TONSILLECTOMY         Family History   Problem Relation Age of Onset   • No Known Problems Father    • Hyperlipidemia Mother    • Hyperlipidemia Maternal Grandfather        Social History     Socioeconomic History   • Marital status: Single   Tobacco Use   • Smoking status: Never Smoker   • Smokeless tobacco: Never Used   Substance and Sexual Activity   • Alcohol use: No   • Drug use: No   • Sexual activity: Yes     Birth control/protection: OCP       Review of Systems   Constitutional: Negative for fever.   HENT: Negative for ear  pain, rhinorrhea and sore throat.    Eyes: Negative for visual disturbance.   Respiratory: Negative for cough and shortness of breath.    Cardiovascular: Negative for chest pain.   Gastrointestinal: Negative for abdominal pain, diarrhea, nausea and vomiting.   Genitourinary: Negative for breast discharge and breast pain.        Lesion on left nipple   Musculoskeletal: Negative.    Skin: Negative for rash.   Neurological: Negative for dizziness and headache.   Psychiatric/Behavioral: Negative for depressed mood.       Objective   Vitals:    09/07/22 0914   BP: 102/80   BP Location: Right arm   Patient Position: Sitting   Cuff Size: Adult   Pulse: 62   Temp: 98.2 °F (36.8 °C)   TempSrc: Temporal   SpO2: 98%   Weight: 52.4 kg (115 lb 9.6 oz)      Body mass index is 20.48 kg/m².  Physical Exam  Vitals and nursing note reviewed.   Constitutional:       Appearance: Normal appearance.   HENT:      Head: Normocephalic and atraumatic.      Right Ear: Tympanic membrane and ear canal normal.      Left Ear: Tympanic membrane and ear canal normal.   Eyes:      Pupils: Pupils are equal, round, and reactive to light.   Cardiovascular:      Rate and Rhythm: Normal rate and regular rhythm.      Heart sounds: Normal heart sounds.   Pulmonary:      Effort: Pulmonary effort is normal.      Breath sounds: Normal breath sounds.   Chest:      Comments: Flesh colored lesion of left nipple without tenderness or drainage   Abdominal:      General: Bowel sounds are normal.      Palpations: Abdomen is soft.      Tenderness: There is no abdominal tenderness.   Genitourinary:     Comments: Deferred   Musculoskeletal:         General: Normal range of motion.      Cervical back: Neck supple.   Skin:     General: Skin is warm and dry.   Neurological:      Mental Status: She is alert and oriented to person, place, and time.   Psychiatric:         Mood and Affect: Mood normal.           Assessment & Plan   Diagnoses and all orders for this  visit:    1. Well female exam without gynecological exam (Primary)    2. Lesion of left nipple  -     US Breast Left Complete; Future    3. Need for pneumococcal vaccination  -     Pneumococcal Conjugate Vaccine 20-Valent (PCV20)    4. Need for HPV vaccination  -     HPV Vaccine (HPV9)    Impression: Currently, she has an inadequate exercise regimen.  Cervical cancer screening is current.  No screening lab work is due at this time.  Pneumococcal and HPV vaccination given today.  She was advised to be evaluated by a dentist.  Advice and education were given regarding aerobic exercise.

## 2022-09-21 ENCOUNTER — HOSPITAL ENCOUNTER (OUTPATIENT)
Dept: ULTRASOUND IMAGING | Facility: HOSPITAL | Age: 27
Discharge: HOME | End: 2022-09-21
Admitting: NURSE PRACTITIONER

## 2022-09-21 DIAGNOSIS — N64.9 LESION OF LEFT NIPPLE: ICD-10-CM

## 2022-09-21 PROCEDURE — 76642 ULTRASOUND BREAST LIMITED: CPT

## 2022-09-30 ENCOUNTER — TELEPHONE (OUTPATIENT)
Dept: FAMILY MEDICINE CLINIC | Facility: CLINIC | Age: 27
End: 2022-09-30

## 2022-10-12 ENCOUNTER — CLINICAL SUPPORT (OUTPATIENT)
Dept: FAMILY MEDICINE CLINIC | Facility: CLINIC | Age: 27
End: 2022-10-12

## 2022-10-12 DIAGNOSIS — Z23 NEED FOR HPV VACCINATION: Primary | ICD-10-CM

## 2022-10-12 PROCEDURE — 90471 IMMUNIZATION ADMIN: CPT | Performed by: NURSE PRACTITIONER

## 2022-10-12 PROCEDURE — 90651 9VHPV VACCINE 2/3 DOSE IM: CPT | Performed by: NURSE PRACTITIONER

## 2023-01-26 ENCOUNTER — OFFICE VISIT (OUTPATIENT)
Dept: OBSTETRICS AND GYNECOLOGY | Facility: CLINIC | Age: 28
End: 2023-01-26
Payer: COMMERCIAL

## 2023-01-26 VITALS
BODY MASS INDEX: 20.68 KG/M2 | SYSTOLIC BLOOD PRESSURE: 100 MMHG | HEIGHT: 62 IN | HEART RATE: 72 BPM | WEIGHT: 112.4 LBS | DIASTOLIC BLOOD PRESSURE: 72 MMHG

## 2023-01-26 DIAGNOSIS — N91.2 AMENORRHEA: Primary | ICD-10-CM

## 2023-01-26 DIAGNOSIS — R39.198 DIFFICULTY VOIDING: ICD-10-CM

## 2023-01-26 LAB
BILIRUB BLD-MCNC: NEGATIVE MG/DL
GLUCOSE UR STRIP-MCNC: NEGATIVE MG/DL
KETONES UR QL: NEGATIVE
LEUKOCYTE EST, POC: NEGATIVE
NITRITE UR-MCNC: NEGATIVE MG/ML
PH UR: 5 [PH] (ref 5–8)
PROT UR STRIP-MCNC: NEGATIVE MG/DL
RBC # UR STRIP: NEGATIVE /UL
SP GR UR: 1.02 (ref 1–1.03)
UROBILINOGEN UR QL: NORMAL

## 2023-01-26 PROCEDURE — 99215 OFFICE O/P EST HI 40 MIN: CPT | Performed by: NURSE PRACTITIONER

## 2023-01-26 PROCEDURE — 81002 URINALYSIS NONAUTO W/O SCOPE: CPT | Performed by: NURSE PRACTITIONER

## 2023-01-26 RX ORDER — MEDROXYPROGESTERONE ACETATE 10 MG/1
10 TABLET ORAL DAILY
Qty: 10 TABLET | Refills: 0 | Status: SHIPPED | OUTPATIENT
Start: 2023-01-26 | End: 2023-02-05

## 2023-01-26 NOTE — PROGRESS NOTES
"Chief Complaint   Patient presents with   • Follow-up     Gyn pt c/o no cycles since 2022        SUBJECTIVE:     Veronica Welsh is a 27 y.o.  who presents with c/o amenorrhea since 2022. She has had negative home pregnancy tests. This is not a new problem, but is the first time I am seeing her for this issue. She is having some premenstrual symptoms. C/o breast tenderness, uterine cramping intermittently. She has hx irregular menses with normal work up in . Last work up was 2 years ago. She has treated with JANA in the past, however reports negative side effects such as decreased libido and vaginal dryness. She is not interested in contraceptives to manage.     Past Medical History:   Diagnosis Date   • Acne       Past Surgical History:   Procedure Laterality Date   • TONSILLECTOMY          Review of Systems   Constitutional: Negative for chills, fatigue and fever.   Gastrointestinal: Negative for abdominal distention and abdominal pain.   Genitourinary: Positive for menstrual problem. Negative for dysuria, frequency, vaginal bleeding, vaginal discharge and vaginal pain.        + amenorrhea       OBJECTIVE:   Vitals:    23 0908   BP: 100/72   Pulse: 72   Weight: 51 kg (112 lb 6.4 oz)   Height: 157.5 cm (62\")        Physical Exam  Constitutional:       General: She is not in acute distress.     Appearance: Normal appearance. She is not ill-appearing, toxic-appearing or diaphoretic.   Genitourinary:      Bladder and urethral meatus normal.      No lesions in the vagina.      Right Labia: No rash, tenderness, lesions, skin changes or Bartholin's cyst.     Left Labia: No tenderness, lesions, skin changes, Bartholin's cyst or rash.     No labial fusion noted.      No inguinal adenopathy present in the right or left side.     No vaginal discharge, erythema, tenderness, bleeding, ulceration or granulation tissue.      No vaginal prolapse present.     No vaginal atrophy present.       Right Adnexa: " not tender, not full, not palpable, no mass present and not absent.     Left Adnexa: not tender, not full, not palpable, no mass present and not absent.     No cervical motion tenderness, discharge, friability, lesion, polyp, nabothian cyst or eversion.      Uterus is not enlarged, fixed, tender, irregular or prolapsed.      No uterine mass detected.  Cardiovascular:      Rate and Rhythm: Normal rate.   Pulmonary:      Effort: Pulmonary effort is normal.   Abdominal:      General: There is no distension.      Palpations: Abdomen is soft. There is no mass.      Tenderness: There is no abdominal tenderness. There is no guarding.      Hernia: No hernia is present. There is no hernia in the left inguinal area or right inguinal area.   Musculoskeletal:      Cervical back: Normal range of motion.   Lymphadenopathy:      Lower Body: No right inguinal adenopathy. No left inguinal adenopathy.   Neurological:      General: No focal deficit present.      Mental Status: She is alert and oriented to person, place, and time.      Cranial Nerves: No cranial nerve deficit.   Skin:     General: Skin is warm and dry.   Psychiatric:         Mood and Affect: Mood normal.         Behavior: Behavior normal.         Thought Content: Thought content normal.         Judgment: Judgment normal.   Vitals and nursing note reviewed.       Assessment/Plan    Diagnoses and all orders for this visit:    1. Amenorrhea (Primary)  -     CBC & Differential  -     DHEA-Sulfate  -     Hemoglobin A1c  -     Prolactin  -     TSH  -     Testosterone  -     Follicle Stimulating Hormone  -     Cancel: US Non-ob Transvaginal; Future  -     medroxyPROGESTERone (Provera) 10 MG tablet; Take 1 tablet by mouth Daily for 10 days.  Dispense: 10 tablet; Refill: 0    2. Difficulty voiding  -     Urine Culture - Urine, Urine, Clean Catch  -     Ambulatory Referral to Gynecologic Urology    Recommend repeating work up given last was 2 years ago  She agrees to repeat work  up today. Will check labs and u/s  She is not interested in contraceptives. Discussed IUD  Discussed provera to induce menses, she is interested in trying. We reviewed uses and side effects  Unable to leave urine sample, advised she should be reasonable certain she is not pregnancy before starting provera.   Declines STD testing  Due for AE, encouraged to schedule  TVUS normal, however bladder is distended. She reports hx of frequent UTI and does not void very often during the day. She has seen urology in the past, but prefers female provider, discussed referral to uro/gyn to further evaluate. She was eventually able to void prior to leaving the office. Discussed bladder training, voiding on a schedule. She is drinking appropriate amounts of water daily.     Follow up: 2-3 weeks for f/u and to review labs and u/s    I spent 45 minutes caring for Vreonica on this date of service. This time includes time spent by me in the following activities: preparing for the visit, reviewing tests, obtaining and/or reviewing a separately obtained history, performing a medically appropriate examination and/or evaluation, counseling and educating the patient/family/caregiver, ordering medications, tests, or procedures, referring and communicating with other health care professionals and documenting information in the medical record    Arline Lowe, APRN  1/26/2023  12:13 EST

## 2023-01-28 LAB
BACTERIA UR CULT: NO GROWTH
BACTERIA UR CULT: NORMAL

## 2023-02-03 ENCOUNTER — TELEPHONE (OUTPATIENT)
Dept: OBSTETRICS AND GYNECOLOGY | Facility: CLINIC | Age: 28
End: 2023-02-03
Payer: COMMERCIAL

## 2023-02-13 ENCOUNTER — TELEPHONE (OUTPATIENT)
Dept: OBSTETRICS AND GYNECOLOGY | Facility: CLINIC | Age: 28
End: 2023-02-13
Payer: COMMERCIAL

## 2023-02-13 NOTE — TELEPHONE ENCOUNTER
She finished medication prescribed to help start cycle on Thursday. Has not started. Was wondering how long it might take to work? Pt Ph [phone]

## 2023-02-13 NOTE — TELEPHONE ENCOUNTER
Typically we see results once the medication is completed. I did order some lab work to further evaluate her absence of periods, but it doesn't appear the labs were drawn yet. If she can return to the office before her next appt to have these collected we will have them back in time for her follow up appointment and can review them at that time. Thank you

## 2023-02-15 LAB
BASOPHILS # BLD AUTO: 0.06 10*3/MM3 (ref 0–0.2)
BASOPHILS NFR BLD AUTO: 0.9 % (ref 0–1.5)
EOSINOPHIL # BLD AUTO: 0.29 10*3/MM3 (ref 0–0.4)
EOSINOPHIL NFR BLD AUTO: 4.6 % (ref 0.3–6.2)
ERYTHROCYTE [DISTWIDTH] IN BLOOD BY AUTOMATED COUNT: 13 % (ref 12.3–15.4)
HCT VFR BLD AUTO: 38.9 % (ref 34–46.6)
HGB BLD-MCNC: 12.7 G/DL (ref 12–15.9)
IMM GRANULOCYTES # BLD AUTO: 0.01 10*3/MM3 (ref 0–0.05)
IMM GRANULOCYTES NFR BLD AUTO: 0.2 % (ref 0–0.5)
LYMPHOCYTES # BLD AUTO: 2.99 10*3/MM3 (ref 0.7–3.1)
LYMPHOCYTES NFR BLD AUTO: 46.9 % (ref 19.6–45.3)
MCH RBC QN AUTO: 28.2 PG (ref 26.6–33)
MCHC RBC AUTO-ENTMCNC: 32.6 G/DL (ref 31.5–35.7)
MCV RBC AUTO: 86.4 FL (ref 79–97)
MONOCYTES # BLD AUTO: 0.49 10*3/MM3 (ref 0.1–0.9)
MONOCYTES NFR BLD AUTO: 7.7 % (ref 5–12)
NEUTROPHILS # BLD AUTO: 2.53 10*3/MM3 (ref 1.7–7)
NEUTROPHILS NFR BLD AUTO: 39.7 % (ref 42.7–76)
NRBC BLD AUTO-RTO: 0 /100 WBC (ref 0–0.2)
PLATELET # BLD AUTO: 283 10*3/MM3 (ref 140–450)
RBC # BLD AUTO: 4.5 10*6/MM3 (ref 3.77–5.28)
TSH SERPL DL<=0.005 MIU/L-ACNC: 1.3 UIU/ML (ref 0.27–4.2)
WBC # BLD AUTO: 6.37 10*3/MM3 (ref 3.4–10.8)

## 2023-02-16 LAB
DHEA-S SERPL-MCNC: 180 UG/DL (ref 84.8–378)
FSH SERPL-ACNC: 7.9 MIU/ML
HBA1C MFR BLD: 4.9 % (ref 4.8–5.6)
PROLACTIN SERPL-MCNC: 41.3 NG/ML (ref 4.8–23.3)
TESTOST SERPL-MCNC: 32 NG/DL (ref 13–71)

## 2023-02-23 ENCOUNTER — OFFICE VISIT (OUTPATIENT)
Dept: OBSTETRICS AND GYNECOLOGY | Facility: CLINIC | Age: 28
End: 2023-02-23
Payer: COMMERCIAL

## 2023-02-23 VITALS
BODY MASS INDEX: 20.06 KG/M2 | DIASTOLIC BLOOD PRESSURE: 69 MMHG | WEIGHT: 109 LBS | HEIGHT: 62 IN | SYSTOLIC BLOOD PRESSURE: 99 MMHG

## 2023-02-23 DIAGNOSIS — R79.89 ELEVATED PROLACTIN LEVEL: ICD-10-CM

## 2023-02-23 DIAGNOSIS — N91.2 AMENORRHEA: Primary | ICD-10-CM

## 2023-02-23 PROCEDURE — 99213 OFFICE O/P EST LOW 20 MIN: CPT | Performed by: NURSE PRACTITIONER

## 2023-02-23 NOTE — PROGRESS NOTES
"Chief Complaint   Patient presents with   • Follow-up     F/u on medication and labs drawn         SUBJECTIVE:     Veronica Welsh is a 27 y.o.  who presents to f/u on amenorrhea and labs. Reports menses started 23, she is currently on her cycle. She was given provera to induce menses, states she took this late because she became ill and was not sure if the medication would worsen her symptoms. States she has urology f/u May 2023. Reports bladder has been fine.     Past Medical History:   Diagnosis Date   • Acne       Past Surgical History:   Procedure Laterality Date   • TONSILLECTOMY          OBJECTIVE:   Vitals:    23 0910   BP: 99/69   Weight: 49.4 kg (109 lb)   Height: 157.5 cm (62\")        Assessment/Plan    Diagnoses and all orders for this visit:    1. Amenorrhea (Primary)    2. Elevated prolactin level  -     Prolactin    Reviewed lab results with pt, mostly normal range with the exception of prolactin which was slightly elevated.   Repeating today she is fasting. Discussed referral to Endocrinology if repeat is elevated  Normal TVUS reviewed with pt ET thin at 0.3, discussed this as a contributing factor. She reports she has been underweight most of her life. She does not exercise routinely. BMI 19.94  Will call if no menses for 3 months for refill of provera.   She is not interested in contraceptives at this time   Discussed ovulation tracking when she is planning for pregnancy    Return in about 3 months (around 2023), or if symptoms worsen or fail to improve.    I spent 21 minutes caring for Veronica on this date of service. This time includes time spent by me in the following activities: preparing for the visit, reviewing tests, obtaining and/or reviewing a separately obtained history, performing a medically appropriate examination and/or evaluation, counseling and educating the patient/family/caregiver, ordering medications, tests, or procedures, referring and communicating with " other health care professionals and documenting information in the medical record    Arline Lowe, APRN  2/23/2023  12:34 EST

## 2023-02-24 DIAGNOSIS — R79.89 ELEVATED PROLACTIN LEVEL: Primary | ICD-10-CM

## 2023-02-24 LAB — PROLACTIN SERPL-MCNC: 40.4 NG/ML (ref 4.8–23.3)

## 2023-02-24 NOTE — PROGRESS NOTES
Please let the pt know that her repeat prolactin level continues to be elevated. When we see this we often will refer to endocrinology for further evaluation. I have entered a referral for this. She should here from us in approx one week for scheduling. Thank you

## 2023-05-02 ENCOUNTER — OFFICE VISIT (OUTPATIENT)
Dept: ENDOCRINOLOGY | Age: 28
End: 2023-05-02
Payer: COMMERCIAL

## 2023-05-02 VITALS
HEART RATE: 63 BPM | HEIGHT: 62 IN | TEMPERATURE: 97.5 F | SYSTOLIC BLOOD PRESSURE: 112 MMHG | BODY MASS INDEX: 21.12 KG/M2 | OXYGEN SATURATION: 97 % | DIASTOLIC BLOOD PRESSURE: 70 MMHG | WEIGHT: 114.8 LBS

## 2023-05-02 DIAGNOSIS — N91.2 AMENORRHEA: ICD-10-CM

## 2023-05-02 DIAGNOSIS — E22.1 HYPERPROLACTINEMIA: Primary | ICD-10-CM

## 2023-05-02 NOTE — PROGRESS NOTES
Referring provider: SAFIA Amador     Reason for consult:  Hyperprolactinemia    HPI:   - 27 year old female here for hyperprolactinemia  - She states her prolactin levels were checked due to amenorrhea  - She states that she never had a menstrual cycle until she was started on OCP's at age 18  - She was on OCP's until about 2 years ago when she stopped them and her menstrual cycles never returned until she was given progesterone which caused a menstrual cycle then in 2/2023 she had a menstrual cycle without any medications  - She states she was less than 80 pounds until she was about 21  - She has gained weight since OCP's were discontinued  - She has daily headaches  - Denies galactorrhea  - She does not use illegal drugs including marijuana    The following portions of the patient's history were reviewed and updated as appropriate: allergies, current medications, past family history, past medical history, past social history, past surgical history and problem list.    Review of Systems   Genitourinary: Positive for amenorrhea.       Objective     Vitals:    05/02/23 1257   BP: 112/70   Pulse: 63   Temp: 97.5 °F (36.4 °C)   SpO2: 97%        Physical Exam  Constitutional:       Appearance: Normal appearance. She is obese.   Eyes:      General: No scleral icterus.  Pulmonary:      Effort: Pulmonary effort is normal. No respiratory distress.   Neurological:      Mental Status: She is alert.      Gait: Gait normal.   Psychiatric:         Mood and Affect: Mood normal.         Behavior: Behavior normal.         Thought Content: Thought content normal.         Judgment: Judgment normal.       Labs/Imaging:  Reviewed labs showing a prolactin of 40 and then 41 in 2/2023, she also had a normal TSH in 2/2023    Assessment & Plan   1. Hyperprolactinemia  - Will order MRI pituitary to rule out stalk effect    2. Amenorrhea  - Possibly related to relatively low body weight    - Return appt. Will be based on lab  results

## 2023-06-04 ENCOUNTER — HOSPITAL ENCOUNTER (OUTPATIENT)
Dept: MRI IMAGING | Facility: HOSPITAL | Age: 28
Discharge: HOME | End: 2023-06-04
Admitting: INTERNAL MEDICINE
Payer: COMMERCIAL

## 2023-06-04 DIAGNOSIS — E22.1 HYPERPROLACTINEMIA: ICD-10-CM

## 2023-06-04 PROCEDURE — 70553 MRI BRAIN STEM W/O & W/DYE: CPT

## 2023-06-04 PROCEDURE — A9577 INJ MULTIHANCE: HCPCS | Performed by: INTERNAL MEDICINE

## 2023-06-04 PROCEDURE — 0 GADOBENATE DIMEGLUMINE 529 MG/ML SOLUTION: Performed by: INTERNAL MEDICINE

## 2023-06-04 RX ADMIN — GADOBENATE DIMEGLUMINE 11 ML: 529 INJECTION, SOLUTION INTRAVENOUS at 11:41

## 2023-06-07 DIAGNOSIS — E22.1 HYPERPROLACTINEMIA: Primary | ICD-10-CM

## 2023-12-17 ENCOUNTER — HOSPITAL ENCOUNTER (EMERGENCY)
Facility: HOSPITAL | Age: 28
Discharge: HOME | End: 2023-12-17
Attending: STUDENT IN AN ORGANIZED HEALTH CARE EDUCATION/TRAINING PROGRAM
Payer: COMMERCIAL

## 2023-12-17 VITALS
BODY MASS INDEX: 20.24 KG/M2 | DIASTOLIC BLOOD PRESSURE: 73 MMHG | HEART RATE: 76 BPM | TEMPERATURE: 99.1 F | RESPIRATION RATE: 18 BRPM | HEIGHT: 62 IN | WEIGHT: 110 LBS | SYSTOLIC BLOOD PRESSURE: 130 MMHG | OXYGEN SATURATION: 98 %

## 2023-12-17 DIAGNOSIS — S05.02XA ABRASION OF LEFT CORNEA, INITIAL ENCOUNTER: Primary | ICD-10-CM

## 2023-12-17 PROCEDURE — 99283 EMERGENCY DEPT VISIT LOW MDM: CPT | Performed by: STUDENT IN AN ORGANIZED HEALTH CARE EDUCATION/TRAINING PROGRAM

## 2023-12-17 PROCEDURE — 99283 EMERGENCY DEPT VISIT LOW MDM: CPT

## 2023-12-17 RX ORDER — OFLOXACIN 3 MG/ML
2 SOLUTION/ DROPS OPHTHALMIC 4 TIMES DAILY
Qty: 1 EACH | Refills: 0 | Status: SHIPPED | OUTPATIENT
Start: 2023-12-17 | End: 2023-12-22

## 2023-12-17 RX ORDER — TETRACAINE HYDROCHLORIDE 5 MG/ML
2 SOLUTION OPHTHALMIC ONCE
Status: DISCONTINUED | OUTPATIENT
Start: 2023-12-17 | End: 2023-12-18 | Stop reason: HOSPADM

## 2023-12-18 NOTE — FSED PROVIDER NOTE
EMERGENCY DEPARTMENT ENCOUNTER    Room Number:  12/12  Date seen:  12/17/2023  Time seen: 23:37 EST  PCP: Provider, No Known        HPI:    28-year-old female presents to the emergency department with complaints of right eye pain after being scratched by a 2-year-old just prior to arrival.  Patient denies any vision changes, but does have some photophobia.  Patient complaining primarily of pain, no other injury.  Patient does not wear contacts.    PAST MEDICAL HISTORY  Active Ambulatory Problems     Diagnosis Date Noted    Allergic rhinitis 02/20/2018    Asthma 06/26/2014    STD exposure 06/07/2021    Acute cystitis with hematuria 06/07/2021     Resolved Ambulatory Problems     Diagnosis Date Noted    No Resolved Ambulatory Problems     Past Medical History:   Diagnosis Date    Acne          PAST SURGICAL HISTORY  Past Surgical History:   Procedure Laterality Date    TONSILLECTOMY           FAMILY HISTORY  Family History   Problem Relation Age of Onset    No Known Problems Father     Hyperlipidemia Mother     Hyperlipidemia Maternal Grandfather          SOCIAL HISTORY  Social History     Socioeconomic History    Marital status: Single   Tobacco Use    Smoking status: Never     Passive exposure: Never    Smokeless tobacco: Never   Vaping Use    Vaping Use: Never used   Substance and Sexual Activity    Alcohol use: No    Drug use: No    Sexual activity: Yes     Partners: Male     Birth control/protection: None         ALLERGIES  Amoxicillin        REVIEW OF SYSTEMS    All systems reviewed and negative except for those discussed in HPI.       PHYSICAL EXAM  ED Triage Vitals [12/17/23 2243]   Temp Heart Rate Resp BP SpO2   99.1 °F (37.3 °C) 76 18 130/73 98 %      Temp src Heart Rate Source Patient Position BP Location FiO2 (%)   Oral Monitor Sitting Right arm --       Vital signs and nursing notes reviewed.  GENERAL: Nontoxic.  Eye: EOMI, PERRLA, no obvious foreign body on visual inspection.  Fluorescein was then  examination shows corneal abrasion in the 4 o'clock position, negative Jose sign.  No retained foreign body.  CV: Normal perfusion.   RESPIRATORY: No respiratory distress.   ABDOMEN: Soft.         LAB RESULTS  No results found for this or any previous visit (from the past 24 hour(s)).    Ordered the above labs and reviewed the results.        RADIOLOGY  No Radiology Exams Resulted Within Past 24 Hours    I ordered the above noted radiological studies. Reviewed by me and discussed with radiologist.  See dictation for official radiology interpretation.        PROCEDURES  Procedures      MEDICATIONS GIVEN IN ER  Medications   fluorescein ophthalmic strip 1 strip (has no administration in time range)   tetracaine (ALTACAINE) 0.5 % ophthalmic solution 2 drop (has no administration in time range)         MEDICATIONS GIVEN IN ER    Medications   fluorescein ophthalmic strip 1 strip (has no administration in time range)   tetracaine (ALTACAINE) 0.5 % ophthalmic solution 2 drop (has no administration in time range)         PROGRESS, DATA ANALYSIS, CONSULTS, AND MEDICAL DECISION MAKING    All labs have been independently reviewed by me.  All radiology studies have been reviewed by me and discussed with radiologist dictating the report.   EKG's independently viewed and interpreted by me.  Discussion below represents my analysis of pertinent findings related to patient's condition, differential diagnosis, treatment plan and final disposition.           AS OF 23:37 EST VITALS:    BP - 130/73  HR - 76  TEMP - 99.1 °F (37.3 °C) (Oral)  02 SATS - 98%      MDM    Patient being evaluated for right eye discomfort after trauma.  Vital signs stable.  Exam shows corneal abrasion to the 4 o'clock position of the right eye.    DDx: Corneal abrasion, corneal ulcer, globe rupture, conjunctivitis.    Fluorescein was then examination showing corneal abrasion, no corneal ulcer, negative Jose sign.  Patient does not wear contacts.    Patient  given prescription for ofloxacin ophthalmologic drops    Shared decision making: Patient agrees to follow-up with ophthalmologist    Social determinants of care: None.    DIAGNOSIS  Final diagnoses:   Abrasion of left cornea, initial encounter         DISPOSITION  Home

## 2023-12-18 NOTE — ED NOTES
Pt stated that she was poked in the right eye by a 2yr old. She stated that it happened around 1730hrs, and still hurts. She stated that she does have light sensitivity.

## 2024-01-08 ENCOUNTER — TELEPHONE (OUTPATIENT)
Dept: ENDOCRINOLOGY | Age: 29
End: 2024-01-08
Payer: COMMERCIAL

## 2024-01-08 NOTE — TELEPHONE ENCOUNTER
Called and spoke with pt if she would like to schedule a follow up appt. Pt was told to follow up with pcp, and she has.

## 2024-02-06 ENCOUNTER — OFFICE VISIT (OUTPATIENT)
Dept: OBSTETRICS AND GYNECOLOGY | Facility: CLINIC | Age: 29
End: 2024-02-06
Payer: COMMERCIAL

## 2024-02-06 VITALS
HEIGHT: 62 IN | BODY MASS INDEX: 19.88 KG/M2 | SYSTOLIC BLOOD PRESSURE: 112 MMHG | DIASTOLIC BLOOD PRESSURE: 76 MMHG | WEIGHT: 108 LBS

## 2024-02-06 DIAGNOSIS — Z01.419 WOMEN'S ANNUAL ROUTINE GYNECOLOGICAL EXAMINATION: Primary | ICD-10-CM

## 2024-02-06 DIAGNOSIS — N39.0 FREQUENT UTI: ICD-10-CM

## 2024-02-06 DIAGNOSIS — Z11.3 SCREENING FOR STD (SEXUALLY TRANSMITTED DISEASE): ICD-10-CM

## 2024-02-06 DIAGNOSIS — N39.0 POSTCOITAL UTI: ICD-10-CM

## 2024-02-06 PROCEDURE — 99213 OFFICE O/P EST LOW 20 MIN: CPT | Performed by: NURSE PRACTITIONER

## 2024-02-06 PROCEDURE — 99395 PREV VISIT EST AGE 18-39: CPT | Performed by: NURSE PRACTITIONER

## 2024-02-06 NOTE — PROGRESS NOTES
GYN Annual Exam     Chief Complaint   Patient presents with    Gynecologic Exam     Pt here today for AE, Last pap  NIL     HPI    Veronica Welsh is a 28 y.o. female who presents for annual well woman exam.  She is sexually active. Periods are irregular, lasting 6 days. Dysmenorrhea:mild, occurring first 1-2 days of flow.  No intermenstrual bleeding, spotting, or discharge. Performing SBE:occasionally. C/o frequent UTI and postcoital UTI. Has had work up with urology in the past, but reports negative experiences at [facility] with 2 different providers. Prior cystoscopy, she is unsure what results indicated. She has been given antibiotics for postcoital UTI, but is more interested in remedying the issue if possible vs taking antibiotics with IC. She does not have UTI symptoms today    OB History          0    Para   0    Term   0       0    AB   0    Living   0         SAB   0    IAB   0    Ectopic   0    Molar   0    Multiple   0    Live Births   0                LMP- 24  Current contraception: condoms  Last Pap-  NIL  History of abnormal Pap smear: no  History of STD-denies  Family history of uterine, colon or ovarian cancer: no  Family history of breast cancer: no  Gardasil Vaccine: completed    Past Medical History:   Diagnosis Date    Acne        Past Surgical History:   Procedure Laterality Date    TONSILLECTOMY           Current Outpatient Medications:     Ashwagandha 125 MG capsule, Take  by mouth., Disp: , Rfl:     Cetirizine HCl (ZYRTEC ALLERGY PO), Take  by mouth As Needed., Disp: , Rfl:     CEPHALEXIN PO, Take  by mouth As Needed (UTI)., Disp: , Rfl:     Allergies   Allergen Reactions    Amoxicillin Hives       Social History     Tobacco Use    Smoking status: Never     Passive exposure: Never    Smokeless tobacco: Never   Vaping Use    Vaping Use: Never used   Substance Use Topics    Alcohol use: No    Drug use: No       Family History   Problem Relation Age of Onset    No  "Known Problems Father     Hyperlipidemia Mother     Hyperlipidemia Maternal Grandfather        Review of Systems   Constitutional:  Negative for chills, fatigue and fever.   Gastrointestinal:  Negative for abdominal distention, abdominal pain, nausea and vomiting.   Genitourinary:  Negative for breast discharge, breast lump, breast pain, dysuria, frequency, menstrual problem, pelvic pain, pelvic pressure, urgency, vaginal bleeding, vaginal discharge and vaginal pain.   Musculoskeletal:  Negative for gait problem.   Skin:  Negative for rash.   Neurological:  Negative for dizziness and headache.   Psychiatric/Behavioral:  Negative for behavioral problems.        /76   Ht 157.5 cm (62\")   Wt 49 kg (108 lb)   LMP 02/06/2024   BMI 19.75 kg/m²     Physical Exam  Constitutional:       General: She is not in acute distress.     Appearance: Normal appearance. She is not ill-appearing, toxic-appearing or diaphoretic.   Genitourinary:      Vulva, bladder and urethral meatus normal.      No lesions in the vagina.      Right Labia: No rash, tenderness, lesions, skin changes or Bartholin's cyst.     Left Labia: No tenderness, lesions, skin changes, Bartholin's cyst or rash.     No labial fusion noted.      No inguinal adenopathy present in the right or left side.     Vaginal bleeding (on menses) present.      No vaginal discharge, erythema, tenderness or ulceration.      No vaginal prolapse present.     No vaginal atrophy present.       Right Adnexa: not tender, not full, not palpable, no mass present and not absent.     Left Adnexa: not tender, not full, not palpable, no mass present and not absent.     No cervical motion tenderness, discharge, friability, lesion, polyp, nabothian cyst or eversion.      Uterus is not enlarged, fixed, tender, irregular or prolapsed.      No uterine mass detected.     No urethral tenderness or mass present.      Pelvic exam was performed with patient in the lithotomy position. "   Breasts:     Breasts are symmetrical.      Right: Present. No swelling, bleeding, inverted nipple, mass, nipple discharge, skin change, tenderness or breast implant.      Left: Present. No swelling, bleeding, inverted nipple, mass, nipple discharge, skin change, tenderness or breast implant.   HENT:      Head: Normocephalic and atraumatic.   Eyes:      Pupils: Pupils are equal, round, and reactive to light.   Cardiovascular:      Rate and Rhythm: Normal rate.   Pulmonary:      Effort: Pulmonary effort is normal.   Abdominal:      General: There is no distension.      Palpations: Abdomen is soft. There is no mass.      Tenderness: There is no abdominal tenderness. There is no guarding.      Hernia: No hernia is present. There is no hernia in the left inguinal area or right inguinal area.   Musculoskeletal:         General: Normal range of motion.      Cervical back: Normal range of motion and neck supple. No tenderness.   Lymphadenopathy:      Cervical: No cervical adenopathy.      Upper Body:      Right upper body: No supraclavicular, axillary or pectoral adenopathy.      Left upper body: No supraclavicular, axillary or pectoral adenopathy.      Lower Body: No right inguinal adenopathy. No left inguinal adenopathy.   Neurological:      General: No focal deficit present.      Mental Status: She is alert and oriented to person, place, and time.      Cranial Nerves: No cranial nerve deficit.   Skin:     General: Skin is warm and dry.   Psychiatric:         Mood and Affect: Mood normal.         Behavior: Behavior normal.         Thought Content: Thought content normal.         Judgment: Judgment normal.   Vitals and nursing note reviewed.         Assessment   Diagnoses and all orders for this visit:    1. Women's annual routine gynecological examination (Primary)  -     IGP,CtNgTv,rfx Apt HPV All    2. Screening for STD (sexually transmitted disease)  -     IGP,CtNgTv,rfx Apt HPV All  -     HIV-1 / O / 2 Ag /  Antibody  -     RPR, Rfx Qn RPR / Confirm TP  -     Hepatitis B Surface Antigen  -     Hepatitis C Antibody    3. Frequent UTI  -     Ambulatory Referral to Gynecologic Urology    4. Postcoital UTI  -     Ambulatory Referral to Gynecologic Urology         Plan   Well woman exam: Pap smear collected. Recommend MVI daily.    Contraception: declines  STD: Enc condoms. Desires STD screen today- Yes. Serum and Pap   Smoking status: nonsmoker   Encouraged annual mammogram screening starting at age 40. Instructed on how to perform SBE. Encouraged breast health self awareness.  6.    Encouraged 150 minutes of exercise per week if not medially contraindicated.   7.    BMI is within normal parameters. No other follow-up for BMI required.  8.     Discussed referral to Uro/gyn. She would like female provider. Rec obtaining records from [facility] to take with her to this visit    Return in about 1 year (around 2/6/2025) for Annual physical.    Arline Lowe, APRN  2/6/2024  18:52 EST

## 2024-02-07 LAB — RPR SER QL: NON REACTIVE

## 2024-02-08 LAB
HBV SURFACE AG SERPL QL IA: NEGATIVE
HCV IGG SERPL QL IA: NON REACTIVE
HIV 1+2 AB+HIV1 P24 AG SERPL QL IA: NON REACTIVE

## 2024-02-09 LAB
C TRACH RRNA CVX QL NAA+PROBE: NEGATIVE
CONV .: NORMAL
CYTOLOGIST CVX/VAG CYTO: NORMAL
CYTOLOGY CVX/VAG DOC CYTO: NORMAL
CYTOLOGY CVX/VAG DOC THIN PREP: NORMAL
DX ICD CODE: NORMAL
HIV 1 & 2 AB SER-IMP: NORMAL
Lab: NORMAL
N GONORRHOEA RRNA CVX QL NAA+PROBE: NEGATIVE
OTHER STN SPEC: NORMAL
STAT OF ADQ CVX/VAG CYTO-IMP: NORMAL
T VAGINALIS RRNA SPEC QL NAA+PROBE: NEGATIVE

## 2024-06-13 ENCOUNTER — OFFICE VISIT (OUTPATIENT)
Dept: OBSTETRICS AND GYNECOLOGY | Facility: CLINIC | Age: 29
End: 2024-06-13
Payer: COMMERCIAL

## 2024-06-13 VITALS
DIASTOLIC BLOOD PRESSURE: 85 MMHG | WEIGHT: 103 LBS | BODY MASS INDEX: 18.25 KG/M2 | HEIGHT: 63 IN | SYSTOLIC BLOOD PRESSURE: 125 MMHG

## 2024-06-13 DIAGNOSIS — N39.0 FREQUENT UTI: Primary | ICD-10-CM

## 2024-06-13 DIAGNOSIS — Z30.018 ENCOUNTER FOR INITIAL PRESCRIPTION OF OTHER CONTRACEPTIVES: ICD-10-CM

## 2024-06-13 DIAGNOSIS — R10.2 PELVIC PAIN: ICD-10-CM

## 2024-06-13 RX ORDER — ETONOGESTREL AND ETHINYL ESTRADIOL VAGINAL RING .015; .12 MG/D; MG/D
1 RING VAGINAL
Qty: 3 EACH | Refills: 4 | Status: SHIPPED | OUTPATIENT
Start: 2024-06-13

## 2024-06-13 RX ORDER — NITROFURANTOIN 25; 75 MG/1; MG/1
100 CAPSULE ORAL ONCE AS NEEDED
Qty: 15 CAPSULE | Refills: 2 | Status: SHIPPED | OUTPATIENT
Start: 2024-06-13

## 2024-06-13 NOTE — PROGRESS NOTES
"Chief Complaint   Patient presents with    Contraception     Talk about bc        SUBJECTIVE:     Veronica Welsh is a 29 y.o.  who presents requesting to discussed contraceptive options. Denies history of migraine with aura, denies history of DVT, there is no family history of DVT. She is a nonsmoker. In the past she has not tolerated oral JANA very well. C/o mood changes and vaginal dryness. She is interested in trying nuvaring.     Hx frequent UTI and postcoital UTI. She has been seen by Urology in the past, was not happy with her care there. At her last visit I referred her to uro-gyn, however she was scheduled with OB/GYN at [facility] who also referred her to uro/gyn and PFPT (for pelvic pain, thought to be cystitis). She has upcoming appt with uro/gyn in July. But states despite multiple attempts she has not received a call to schedule PFPT  Past Medical History:   Diagnosis Date    Acne       Past Surgical History:   Procedure Laterality Date    TONSILLECTOMY          Review of Systems   Constitutional:  Negative for chills, fatigue and fever.   Gastrointestinal:  Negative for abdominal distention and abdominal pain.   Genitourinary:  Positive for pelvic pain. Negative for dyspareunia, dysuria, menstrual problem, vaginal bleeding, vaginal discharge and vaginal pain.        + frequent UTI       OBJECTIVE:   Vitals:    24 1421   BP: 125/85   Weight: 46.7 kg (103 lb)   Height: 160 cm (63\")        Physical Exam  Constitutional:       General: She is not in acute distress.     Appearance: Normal appearance. She is not ill-appearing, toxic-appearing or diaphoretic.   Cardiovascular:      Rate and Rhythm: Normal rate.   Pulmonary:      Effort: Pulmonary effort is normal.   Musculoskeletal:         General: Normal range of motion.      Cervical back: Normal range of motion.   Neurological:      General: No focal deficit present.      Mental Status: She is alert and oriented to person, place, and time.   Skin:    "  General: Skin is dry.   Psychiatric:         Mood and Affect: Mood normal.         Behavior: Behavior normal.         Thought Content: Thought content normal.         Judgment: Judgment normal.   Vitals and nursing note reviewed.         Assessment/Plan    Diagnoses and all orders for this visit:    1. Frequent UTI (Primary)  -     nitrofurantoin, macrocrystal-monohydrate, (Macrobid) 100 MG capsule; Take 1 capsule by mouth 1 (One) Time As Needed (following intercourse) for up to 1 dose.  Dispense: 15 capsule; Refill: 2    2. Pelvic pain  -     Ambulatory Referral to Physical Therapy    3. Encounter for initial prescription of other contraceptives  -     etonogestrel-ethinyl estradiol (NuvaRing) 0.12-0.015 MG/24HR vaginal ring; Insert 1 each into the vagina Every 28 (Twenty-Eight) Days. Insert vaginally and leave in place for 3 consecutive weeks, then remove for 1 week.  Dispense: 3 each; Refill: 4    Discussed contraception options at length including pills, patch, vaginal ring, POPs,  injection, implant, and IUDs.  The risks and benefits of the methods were discussed including but not limited to the increased risk of heart attack, blood clot, and stroke.  It was discussed the contraception does not protect against sexually transmitted infections and condoms are encouraged. The patient desires to start nuvaring. Discussed start up, uses, side effects, risks vs benefits. Encouraged condoms for the first 3 weeks of use as back up.     Referral for PFPT entered    Frequent UTI: Unclear how she was scheduled with ob/gyn instead of uro/gyn. She is understandably frustrated by this. She does have upcoming appt with Uro/gyn in July. She has already implemented diet changes for IC. She would like to restart antibiotics for prevention of postcoital UTI. This was prescribed    Return if symptoms worsen or fail to improve.    I spent 30 minutes caring for Veronica on this date of service. This time includes time spent by me in  the following activities: preparing for the visit, reviewing tests, obtaining and/or reviewing a separately obtained history, performing a medically appropriate examination and/or evaluation, counseling and educating the patient/family/caregiver, ordering medications, tests, or procedures, referring and communicating with other health care professionals, documenting information in the medical record, and independently interpreting results and communicating that information with the patient/family/caregiver    SAFIA Amador  6/13/2024  19:31 EDT

## 2024-06-28 ENCOUNTER — OFFICE VISIT (OUTPATIENT)
Dept: FAMILY MEDICINE CLINIC | Facility: CLINIC | Age: 29
End: 2024-06-28
Payer: COMMERCIAL

## 2024-06-28 VITALS
BODY MASS INDEX: 18.5 KG/M2 | OXYGEN SATURATION: 99 % | SYSTOLIC BLOOD PRESSURE: 104 MMHG | HEART RATE: 102 BPM | HEIGHT: 63 IN | TEMPERATURE: 98 F | WEIGHT: 104.4 LBS | DIASTOLIC BLOOD PRESSURE: 64 MMHG

## 2024-06-28 DIAGNOSIS — Z71.85 VACCINE COUNSELING: ICD-10-CM

## 2024-06-28 DIAGNOSIS — Z00.00 WELLNESS EXAMINATION: Primary | ICD-10-CM

## 2024-06-28 DIAGNOSIS — N30.10 INTERSTITIAL CYSTITIS: ICD-10-CM

## 2024-06-28 PROBLEM — N30.01 ACUTE CYSTITIS WITH HEMATURIA: Status: RESOLVED | Noted: 2021-06-07 | Resolved: 2024-06-28

## 2024-06-28 PROCEDURE — 90715 TDAP VACCINE 7 YRS/> IM: CPT | Performed by: STUDENT IN AN ORGANIZED HEALTH CARE EDUCATION/TRAINING PROGRAM

## 2024-06-28 PROCEDURE — 99395 PREV VISIT EST AGE 18-39: CPT | Performed by: STUDENT IN AN ORGANIZED HEALTH CARE EDUCATION/TRAINING PROGRAM

## 2024-06-28 PROCEDURE — 90471 IMMUNIZATION ADMIN: CPT | Performed by: STUDENT IN AN ORGANIZED HEALTH CARE EDUCATION/TRAINING PROGRAM

## 2024-06-28 NOTE — PROGRESS NOTES
"Chief Complaint  Establish Care    Subjective        Veronica Welsh presents to [facility]  History of Present Illness    Here to reestablish care. Needs an annual visit. Wanted to get her Tdap vaccine. Her friend just had a baby. Recently was diagnosed with interstitial cystitis but urogyn and is going to being pelvic floor PT. Does have a healthy diet. Doesn't exercise much. Currently sexually active and on birth control. HTN runs in her family. Drinks alcohol on occasion, no smoking.     Objective   Vital Signs:  /64 (BP Location: Left arm, Patient Position: Sitting, Cuff Size: Small Adult)   Pulse 102   Temp 98 °F (36.7 °C)   Ht 160 cm (63\")   Wt 47.4 kg (104 lb 6.4 oz)   SpO2 99%   BMI 18.49 kg/m²   Estimated body mass index is 18.49 kg/m² as calculated from the following:    Height as of this encounter: 160 cm (63\").    Weight as of this encounter: 47.4 kg (104 lb 6.4 oz).             Physical Exam  Vitals and nursing note reviewed.   Constitutional:       General: She is not in acute distress.     Appearance: Normal appearance. She is not ill-appearing.   HENT:      Head: Normocephalic and atraumatic.      Nose: Nose normal.      Mouth/Throat:      Mouth: Mucous membranes are moist.   Eyes:      Extraocular Movements: Extraocular movements intact.      Conjunctiva/sclera: Conjunctivae normal.   Cardiovascular:      Rate and Rhythm: Normal rate and regular rhythm.      Heart sounds: Normal heart sounds. No murmur heard.     No gallop.   Pulmonary:      Effort: Pulmonary effort is normal. No respiratory distress.      Breath sounds: Normal breath sounds. No stridor. No wheezing, rhonchi or rales.   Chest:      Chest wall: No tenderness.   Skin:     General: Skin is warm and dry.   Neurological:      General: No focal deficit present.      Mental Status: She is alert and oriented to person, place, and time. Mental status is at baseline.   Psychiatric:         Mood and " Affect: Mood normal.         Behavior: Behavior normal.        Result Review :                     Assessment and Plan     Diagnoses and all orders for this visit:    1. Wellness examination (Primary)    2. Vaccine counseling  -     Tdap Vaccine => 8yo IM (BOOSTRIX/ADACEL)    3. Interstitial cystitis    Preventative health info attached to AVS. Labs have been reviewed. Is starting pelvic floor PT for interstitial cystis. Working with GYN for BC and irregular periods.          Follow Up     Return in about 1 year (around 6/28/2025) for Annual physical.  Patient was given instructions and counseling regarding her condition or for health maintenance advice. Please see specific information pulled into the AVS if appropriate.

## 2024-07-03 ENCOUNTER — PATIENT ROUNDING (BHMG ONLY) (OUTPATIENT)
Dept: FAMILY MEDICINE CLINIC | Facility: CLINIC | Age: 29
End: 2024-07-03
Payer: COMMERCIAL

## 2024-07-03 NOTE — PROGRESS NOTES
A My-Chart message has been sent to the patient for PATIENT ROUNDING with [facility] [place]

## 2024-07-17 ENCOUNTER — HOSPITAL ENCOUNTER (OUTPATIENT)
Dept: PHYSICAL THERAPY | Facility: HOSPITAL | Age: 29
Discharge: HOME | End: 2024-07-17
Admitting: NURSE PRACTITIONER
Payer: COMMERCIAL

## 2024-07-17 DIAGNOSIS — R10.2 PELVIC PAIN: Primary | ICD-10-CM

## 2024-07-17 PROCEDURE — 97530 THERAPEUTIC ACTIVITIES: CPT

## 2024-07-17 PROCEDURE — 97162 PT EVAL MOD COMPLEX 30 MIN: CPT

## 2024-07-17 NOTE — THERAPY EVALUATION
Outpatient Physical Therapy Pelvic Health Initial Evaluation  [facility]     Patient Name: Veronica Welsh  : 1995  MRN: [id number]  Today's Date: 2024        Visit Date: 2024    Patient Active Problem List   Diagnosis    Allergic rhinitis    Childhood asthma without complication    STD exposure    Interstitial cystitis        Past Medical History:   Diagnosis Date    Acne         Past Surgical History:   Procedure Laterality Date    TONSILLECTOMY           Visit Dx:    ICD-10-CM ICD-9-CM   1. Pelvic pain  R10.2 [id number]        Patient History       Row Name 24 1000             Daily Activities    Primary Language English  -CC      Barriers to learning None  -CC      Pt Participated in POC and Goals Yes  -CC                User Key  (r) = Recorded By, (t) = Taken By, (c) = Cosigned By      Initials Name Provider Type    CC Rosey Vu, PT Physical Therapist                     Pelvic Health       Row Name 24 1000             Subjective    Patient Reason for Visit Pelvic Pain  -CC      Brief Description of Chief Complaint Veronica Welsh is a 29 y.o. female who presents today with pelvic pain. Reports hx of recurrent UTI following sex for years. Was supposed to see urogyn, but accidentally was referred to OBGYN. Was dx with interstitial cystitis. Just started with nuevoring. Reports pain with deep penetration during intercourse. Reports specific position (partner from behind), legs in full flexion on back. Also reports burning or discomfort following intercourse. Denies pain/burning with urination typically, sometimes after sex. Feels pressure and discomfort relieved with bladder emptying. Feels like she used to wait to long to go to the bathroom, but has gotten better about trying to tune into signs of going. PMH includes frequent UTI, postcoital UTI, , irregular periods.  -CC      Patient Participated in POC and Goals Yes  -CC         Daily Activities    Primary Language  English  -CC      Barriers to learning None  -CC      Pt Participated in POC and Goals Yes  -CC         Urinary/Bowel History    Difficulty starting stream Sometimes after sex  -CC      Stress incontinence Sometimes - hasn't paid attention to know for sure  -CC      Urgency Occasional, not often  -CC      Nocturia (times per night) 0-1  -CC      Daytime frequency (hours) 4-5 times per day - doesn't always feel urge to go, but goes in because she hasn't been in a while  -CC      Fluid Intake Water (unsure how much per day), Iced coffee (small), soda intermittently, cranberry juice. Tries to avoid citrus.  -CC         Pregnancy/Sexual History    Number of Pregnancies 0  -CC      Menstruation Neuvaring  -CC         Pelvic Floor Muscle    Patient/Parent/Guardian Consented to Internal Pelvic Floor Exam Yes  -CC      Strength (Right) 2: Squeeze no lift  -CC      Strength (Left) 2: Squeeze no lift  -CC      Symmetry of Sustained Maximal Contraction Symmetrical  -CC      External pelvic floor palpation Bulbocavernosus  -CC      Bulbocavernosus Bilateral:;Tender  -CC      1st Layer Tone/Internal Palpation 3-4/10 pain with meghann bulbocavernosus palpation (L>R), mild decreased mobility superficial transverse perineal vs introitus; generalized hypotonicity  -CC      2nd Layer Tone/Internal Palpation generalized hypotonicity, reports 5-6/10 pain with lateral mobility  -CC      3rd Layer Tone/Internal Palpation generalized hypotonicity; moderate to severe TTP B LA, and severe TTP B OI  -CC         Education Provided On:    Education Points Self-Mobilization of soft tissue;Vagina/rectal stretching;HEP  -CC      Method of Delivery Demonstration;Verbal;Written  -CC      Education Provided To Patient  -CC      Level of Understanding Teach back education performed;Verbalized;Demonstrated  -CC      HEP Comments perineal massage handout  -CC      Education Comments Eval findings, goals of PT, POC, HEP  -CC         Outcome Measures     Outcome Measure Options Female NIH-CPSI  -CC         Female NIH-CPSI    Pain Total 11  -CC      Urinary Symptoms Total 1  -CC      Quality of Life Impact Total 6  -CC      Total Score 18  -CC                User Key  (r) = Recorded By, (t) = Taken By, (c) = Cosigned By      Initials Name Provider Type    CC Rosey Vu, PT Physical Therapist                                  PT Assessment/Plan       Row Name 07/17/24 1200          PT Assessment    Functional Limitations Limitation in home management;Limitations in community activities;Limitations in functional capacity and performance;Performance in leisure activities;Performance in self-care ADL  -CC     Impairments Posture;Poor body mechanics;Pain;Muscle strength;Impaired flexibility;Impaired muscle length;Impaired muscle power;Impaired muscle endurance;Coordination  -CC     Assessment Comments Veronica Welsh is a 29 y.o. female referred to physical therapy for pelvic pain. She presents with an evolving clinical presentation, along with no remarkable comorbidities and personal factors of chronicity of pain and history of frequent UTIs that may impact her progress in the plan of care. Pt presents today with generalized hypotonicity all 3 layers PFM, TTP/taut muscle bands meghann LA/OI, TTP bulbocavernosus meghann, and decreased PFM strength and coordination . Her signs and symptoms are consistent with a physical therapy diagnosis of pelvic floor muscle hypotonicity. The previous impairments limit her ability to participate in pelvic exams, participate in intercourse, and void within appropriate time frames throughout the day. Pt will benefit from skilled PT to address the previous impairments and return to PLOF.  -CC     Please refer to paper survey for additional self-reported information Yes  -CC     Rehab Potential Good  -CC     Patient/caregiver participated in establishment of treatment plan and goals Yes  -CC     Patient would benefit from skilled therapy  intervention Yes  -CC        PT Plan    PT Frequency 1x/week  -CC     Predicted Duration of Therapy Intervention (PT) 8-12 visits  -CC     Planned CPT's? PT EVAL MOD COMPLELITY: 79108;PT RE-EVAL: 29972;PT THER PROC EA 15 MIN: 94262;PT THER ACT EA 15 MIN: 50944;PT MANUAL THERAPY EA 15 MIN: 25440;PT NEUROMUSC RE-EDUCATION EA 15 MIN: 37929;PT GAIT TRAINING EA 15 MIN: 34916;PT SELF CARE/HOME MGMT/TRAIN EA 15: 73899;PT HOT OR COLD PACK TREAT MCARE  -CC     PT Plan Comments Next visit: response to self vaginal stretching? Manual to layers 1 and 2, eventually TP release and discuss pelvic wand for 3rd layer. Add diaphragmatic breathing with PFM lengthening, happy baby, child's pose  -CC               User Key  (r) = Recorded By, (t) = Taken By, (c) = Cosigned By      Initials Name Provider Type    CC Rosey Vu, PT Physical Therapist                       OP Exercises       Row Name 24 1200 24 1000          Subjective    Patient Reason for Visit -- Pelvic Pain  -CC     Brief Description of Chief Complaint -- Veronica Welsh is a 29 y.o. female who presents today with pelvic pain. Reports hx of recurrent UTI following sex for years. Was supposed to see urogyn, but accidentally was referred to OBGYN. Was dx with interstitial cystitis. Just started with nuevoring. Reports pain with deep penetration during intercourse. Reports specific position (partner from behind), legs in full flexion on back. Also reports burning or discomfort following intercourse. Denies pain/burning with urination typically, sometimes after sex. Feels pressure and discomfort relieved with bladder emptying. Feels like she used to wait to long to go to the bathroom, but has gotten better about trying to tune into signs of going. PMH includes frequent UTI, postcoital UTI, , irregular periods.  -CC     Patient Participated in POC and Goals -- Yes  -CC        Total Minutes    37595 - PT Therapeutic Activity Minutes 10  -CC --         Exercise 1    Exercise Name 1 perineal massage for home - demo on pelvic model  -CC --     Cueing 1 Verbal  -CC --               User Key  (r) = Recorded By, (t) = Taken By, (c) = Cosigned By      Initials Name Provider Type    Rosey Lopez, PT Physical Therapist                                 PT OP Goals       Row Name 07/17/24 1200          PT Short Term Goals    STG Date to Achieve 08/16/24  -CC     STG 1 Pt will be independent with initial HEP  -CC     STG 1 Progress New  -CC     STG 2 Pt will report</=2/10 pain at first and second layer PFM exam  -CC     STG 2 Progress New  -CC        Long Term Goals    LTG Date to Achieve 09/15/24  -CC     LTG 1 Pt will be independent with comprehensive HEP for long term management of condition.  -CC     LTG 1 Progress New  -CC     LTG 2 Pt will score </=8 on Female NIH-CPSI to indicated improved QOL.  -CC     LTG 2 Progress New  -CC     LTG 3 Pt will report ability to participate in intercourse with </=3/10 pelvic pain.  -CC     LTG 3 Progress New  -CC     LTG 4 Pt will report ability to participate in pelvic exams with </=3/10 pain.  -CC     LTG 4 Progress New  -CC     LTG 5 Pt will demo at least 3/5 PFM strength.  -CC     LTG 5 Progress New  -CC     LTG 6 Pt will report appropriate voiding windows with average of urinating 6-8 times per day to avoid accerbation of pelvic pain/tightness.  -CC     LTG 6 Progress New  -CC        Time Calculation    PT Goal Re-Cert Due Date 10/15/24  -CC               User Key  (r) = Recorded By, (t) = Taken By, (c) = Cosigned By      Initials Name Provider Type    Rosey Lopez, PT Physical Therapist                                    Time Calculation:   Start Time: 1050 (pt arrival time)  Stop Time: 1139  Time Calculation (min): 49 min  Total Timed Code Minutes- PT: 10 minute(s)  Timed Charges  83561 - PT Therapeutic Activity Minutes: 10  Untimed Charges  PT Eval/Re-eval Minutes: 39  Total Minutes  Timed Charges Total  Minutes: 10  Untimed Charges Total Minutes: 39   Total Minutes: 49  Therapy Charges for Today       Code Description Service Date Service Provider Modifiers Qty    29660516490 HC PT THERAPEUTIC ACT EA 15 MIN 7/17/2024 Rosey Vu, PT GP 1    24559425472 HC PT EVAL MOD COMPLEXITY 3 7/17/2024 Rosey Vu, PT GP 1                    Rosey Vu, PT  7/17/2024

## 2024-08-06 ENCOUNTER — HOSPITAL ENCOUNTER (OUTPATIENT)
Dept: PHYSICAL THERAPY | Facility: HOSPITAL | Age: 29
Setting detail: THERAPIES SERIES
Discharge: HOME | End: 2024-08-06
Payer: COMMERCIAL

## 2024-08-06 DIAGNOSIS — R10.2 PELVIC PAIN: Primary | ICD-10-CM

## 2024-08-06 PROCEDURE — 97140 MANUAL THERAPY 1/> REGIONS: CPT

## 2024-08-06 PROCEDURE — 97530 THERAPEUTIC ACTIVITIES: CPT

## 2024-08-06 NOTE — THERAPY TREATMENT NOTE
Outpatient Physical Therapy Pelvic Health Treatment Note  [facility]     Patient Name: Veronica Welsh  : 1995  MRN: [id number]  Today's Date: 2024        Visit Date: 2024    Visit Dx:    ICD-10-CM ICD-9-CM   1. Pelvic pain  R10.2 [id number]       Patient Active Problem List   Diagnosis    Allergic rhinitis    Childhood asthma without complication    STD exposure    Interstitial cystitis         Pelvic Health       Row Name 24 1000             Pelvic Floor Muscle    Patient/Parent/Guardian Consented to Internal Pelvic Floor Exam Yes  -CC      1st Layer Tone/Internal Palpation 3-4/10 pain with meghann bulbocavernosus palpation (L>R), mild decreased mobility superficial transverse perineal vs introitus; generalized hypotonicity  -CC         Education Provided On:    HEP Comments Access Code [email or web address]  -CC                User Key  (r) = Recorded By, (t) = Taken By, (c) = Cosigned By      Initials Name Provider Type    Rosey Lopez, PT Physical Therapist                                  PT Assessment/Plan       Row Name 24 1300          PT Assessment    Assessment Comments Pt arrives for first f/u visit reporting initial good HEP compliance, although decreased over past week. Continues to have pain with IC, discussed adding in pelvic floor stretching will take several moneths of consistency to address and resolve pain with IC. Recommended use of water based lubricant with IC. Initiated manual therapy to vaginal introitus, and reviewed technique for home. Progressed HEP within time constraints. Pt reports some financial concerns over cost of PF PT pending insuranc coveraged, encouraged pt to reachout to insurance company to discuss. Remains candidate for skilled PT.  -CC        PT Plan    PT Plan Comments Cont manual. Eventually 3rd layer as well, add child's pose, Z sit, deep squat  -CC               User Key  (r) = Recorded By, (t) = Taken By, (c) = Cosigned By      Initials Name  Provider Type    Rosey Lopez, PT Physical Therapist                       OP Exercises       Row Name 08/06/24 1100             Subjective    Subjective Comments Was consistent with HEP for 2 weeks, but not as consistent last week. Switched birth controls, having new pain with IC  -CC         Subjective Pain    Subjective Pain Comment Arrives 10:12 for 10:00 appt  -CC         Total Minutes    72760 - PT Therapeutic Activity Minutes 20  -CC      55738 - PT Manual Therapy Minutes 12  -CC         Exercise 1    Exercise Name 1 Review of perineal massage for home: pin and hold and sweeping  -CC      Cueing 1 Verbal;Tactile;Demo  -CC         Exercise 2    Exercise Name 2 Discussed use of water based lubricant with IC  -CC      Cueing 2 Verbal  -CC         Exercise 3    Exercise Name 3 happy baby  -CC      Cueing 3 Verbal  -CC      Time 3 1 min  -CC         Exercise 4    Exercise Name 4 diaphragmatic breathing with pelvic floor lengthening  -CC      Cueing 4 Verbal  -CC      Reps 4 10  -CC                User Key  (r) = Recorded By, (t) = Taken By, (c) = Cosigned By      Initials Name Provider Type    Rosey Lopez, JOHANA Physical Therapist                  Manual Rx (Last 36 Hours)       Manual Treatments       Row Name 08/06/24 1100             Total Minutes    78867 - PT Manual Therapy Minutes 12  -CC         Manual Rx 1    Manual Rx 1 Location 1st layer PFM pin and hold and gentle sweeping  -CC                User Key  (r) = Recorded By, (t) = Taken By, (c) = Cosigned By      Initials Name Provider Type    Rosey Lopez PT Physical Therapist                                                  Time Calculation:   Start Time: 1013  Stop Time: 1045  Time Calculation (min): 32 min  Total Timed Code Minutes- PT: 32 minute(s)  Timed Charges  66093 - PT Manual Therapy Minutes: 12  24360 - PT Therapeutic Activity Minutes: 20  Total Minutes  Timed Charges Total Minutes: 32   Total Minutes: 32  Therapy  Charges for Today       Code Description Service Date Service Provider Modifiers Qty    88461634939  PT THERAPEUTIC ACT EA 15 MIN 8/6/2024 Rosey Vu, PT GP 1    68035855865 HC PT MANUAL THERAPY EA 15 MIN 8/6/2024 Rosey Vu, PT GP 1                      Rosey Vu, PT  8/6/2024

## 2024-08-14 ENCOUNTER — APPOINTMENT (OUTPATIENT)
Dept: PHYSICAL THERAPY | Facility: HOSPITAL | Age: 29
End: 2024-08-14
Payer: COMMERCIAL

## 2024-08-21 ENCOUNTER — APPOINTMENT (OUTPATIENT)
Dept: PHYSICAL THERAPY | Facility: HOSPITAL | Age: 29
End: 2024-08-21
Payer: COMMERCIAL

## 2024-08-28 ENCOUNTER — HOSPITAL ENCOUNTER (OUTPATIENT)
Dept: PHYSICAL THERAPY | Facility: HOSPITAL | Age: 29
Discharge: HOME | End: 2024-08-28
Admitting: NURSE PRACTITIONER
Payer: COMMERCIAL

## 2024-08-28 DIAGNOSIS — R10.2 PELVIC PAIN: Primary | ICD-10-CM

## 2024-08-28 PROCEDURE — 97140 MANUAL THERAPY 1/> REGIONS: CPT

## 2024-08-28 PROCEDURE — 97530 THERAPEUTIC ACTIVITIES: CPT

## 2024-08-28 NOTE — THERAPY PROGRESS REPORT/RE-CERT
Outpatient Physical Therapy Pelvic Health Progress Note  [facility]     Patient Name: Veronica Welsh  : 1995  MRN: [id number]  Today's Date: 2024        Visit Date: 2024    Visit Dx:    ICD-10-CM ICD-9-CM   1. Pelvic pain  R10.2 [id number]       Patient Active Problem List   Diagnosis    Allergic rhinitis    Childhood asthma without complication    STD exposure    Interstitial cystitis         Pelvic Health       Row Name 24 1400             Pelvic Floor Muscle    Patient/Parent/Guardian Consented to Internal Pelvic Floor Exam Yes  -CC      1st Layer Tone/Internal Palpation 3/10 pain with meghann bulbocavernosus palpation (L>R), mild decreased mobility superficial transverse perineal vs introitus; generalized hypotonicity  -CC      2nd Layer Tone/Internal Palpation generalized hypotonicity, reports 4/10 pain with lateral mobility  -CC      3rd Layer Tone/Internal Palpation generalized hypotonicity; moderate to severe TTP B LA, and severe TTP B OI  -CC                User Key  (r) = Recorded By, (t) = Taken By, (c) = Cosigned By      Initials Name Provider Type    CC Rosey Vu, PT Physical Therapist                                  PT Assessment/Plan       Row Name 24 1400          PT Assessment    Functional Limitations Limitation in home management;Limitations in community activities;Limitations in functional capacity and performance;Performance in leisure activities;Performance in self-care ADL  -CC     Impairments Posture;Poor body mechanics;Pain;Muscle strength;Impaired flexibility;Impaired muscle length;Impaired muscle power;Impaired muscle endurance;Coordination  -CC     Assessment Comments Veronica Welsh has been seen for 3 physical therapy sessions for pelvic pain/dyspareunia.  Treatment has included manual therapy, therapeutic activity, and patient education with home exercise program . Progress to physical therapy goals is fair. Pt has met 1/2 STG and 0/6 LTG. Progress  towards goals limited by pts financial concerns regarding cost of appts, and needing to spread appts further apart. She continues to demo generalized hypotonicity all 3 layers PFM, TTP/taut muscle bands meghann LA/OI with severe pain, TTP bulbocavernosus meghann although improved from evaluation, and decreased PFM strength and coordination . She will benefit from continued skilled physical therapy to address remaining impairments and functional limitations.  -CC     Please refer to paper survey for additional self-reported information No  -CC     Rehab Potential Good  -CC     Patient/caregiver participated in establishment of treatment plan and goals Yes  -CC     Patient would benefit from skilled therapy intervention Yes  -CC        PT Plan    PT Frequency 1x/week  -CC     Predicted Duration of Therapy Intervention (PT) 8-10 more visits  -CC     PT Plan Comments Cont manual, assess response to pelvic wand, add deep squat. Pt is deciding if she wants to transfer to another facility that offers pelvic floor PT, as we discussed I will be out on maternity leave after September, and she knows the other pelvic floor PT provider at this facility personally, and feels she may feel better with someone she doesn't know. I provided her with the number of a few other facilities that offer Pelvic PT in the area should she want to transfer care.  -CC               User Key  (r) = Recorded By, (t) = Taken By, (c) = Cosigned By      Initials Name Provider Type    Rosey Lopez, PT Physical Therapist                       OP Exercises       Row Name 08/28/24 1400             Subjective    Subjective Comments Doing HEP at home. Likely need to decr frequency to 1x every 4-5 weeks due to cost.  -CC         Total Minutes    30663 - PT Therapeutic Activity Minutes 25  -CC      46391 - PT Manual Therapy Minutes 13  -CC         Exercise 1    Exercise Name 1 Discussed use of pelvic wand to address B LA/OI tightness  -CC      Cueing 1  Verbal  -CC         Exercise 3    Exercise Name 3 Z sit with side bending  -CC      Cueing 3 Verbal  -CC      Sets 3 1 ea side  -CC      Reps 3 10 ea way  -CC         Exercise 4    Exercise Name 4 child's pose with diaphragmatic breathing  -CC      Cueing 4 Verbal  -CC      Reps 4 10  -CC                User Key  (r) = Recorded By, (t) = Taken By, (c) = Cosigned By      Initials Name Provider Type    CC Rosey Vu, PT Physical Therapist                  Manual Rx (Last 36 Hours)       Manual Treatments       Row Name 08/28/24 1400             Total Minutes    73315 - PT Manual Therapy Minutes 13  -CC         Manual Rx 1    Manual Rx 1 Location 1st layer PFM pin and hold and gentle sweeping  -CC         Manual Rx 2    Manual Rx 2 Location B LA gentle pin and hold  -CC                User Key  (r) = Recorded By, (t) = Taken By, (c) = Cosigned By      Initials Name Provider Type    CC Rosey Vu, PT Physical Therapist                               PT OP Goals       Row Name 08/28/24 1000          PT Short Term Goals    STG Date to Achieve 09/27/24  -CC     STG 1 Pt will be independent with initial HEP  -CC     STG 1 Progress Met  -CC     STG 2 Pt will report</=2/10 pain at first and second layer PFM exam  -CC     STG 2 Progress Ongoing  -CC     STG 2 Progress Comments 3/10 pain today  -        Long Term Goals    LTG Date to Achieve 09/15/24  -     LTG 1 Pt will be independent with comprehensive HEP for long term management of condition.  -CC     LTG 1 Progress Ongoing  -CC     LTG 2 Pt will score </=8 on Female NIH-CPSI to indicated improved QOL.  -CC     LTG 2 Progress Ongoing  -CC     LTG 3 Pt will report ability to participate in intercourse with </=3/10 pelvic pain.  -CC     LTG 3 Progress Ongoing  -CC     LTG 4 Pt will report ability to participate in pelvic exams with </=3/10 pain.  -CC     LTG 4 Progress Ongoing  -CC     LTG 5 Pt will demo at least 3/5 PFM strength.  -     LTG 5  Progress Ongoing  -CC     LTG 6 Pt will report appropriate voiding windows with average of urinating 6-8 times per day to avoid accerbation of pelvic pain/tightness.  -CC     LTG 6 Progress Ongoing  -CC               User Key  (r) = Recorded By, (t) = Taken By, (c) = Cosigned By      Initials Name Provider Type    CC Rosey Vu, PT Physical Therapist                                    Time Calculation:   Start Time: 1005  Stop Time: 1043  Time Calculation (min): 38 min  Total Timed Code Minutes- PT: 38 minute(s)  Timed Charges  97571 - PT Manual Therapy Minutes: 13  92275 - PT Therapeutic Activity Minutes: 25  Total Minutes  Timed Charges Total Minutes: 38   Total Minutes: 38  Therapy Charges for Today       Code Description Service Date Service Provider Modifiers Qty    59031430088  PT THERAPEUTIC ACT EA 15 MIN 8/28/2024 Rosey Vu, PT GP 2    [id number]  PT MANUAL THERAPY EA 15 MIN 8/28/2024 Rosey Vu, PT GP 1                      Rosey Vu PT  8/28/2024

## 2024-12-31 ENCOUNTER — OFFICE VISIT (OUTPATIENT)
Dept: FAMILY MEDICINE CLINIC | Facility: CLINIC | Age: 29
End: 2024-12-31
Payer: COMMERCIAL

## 2024-12-31 VITALS
DIASTOLIC BLOOD PRESSURE: 76 MMHG | TEMPERATURE: 97.8 F | WEIGHT: 108 LBS | HEART RATE: 110 BPM | HEIGHT: 63 IN | SYSTOLIC BLOOD PRESSURE: 104 MMHG | BODY MASS INDEX: 19.14 KG/M2 | OXYGEN SATURATION: 99 %

## 2024-12-31 DIAGNOSIS — R30.9 PAIN WITH URINATION: Primary | ICD-10-CM

## 2024-12-31 DIAGNOSIS — Z23 ENCOUNTER FOR IMMUNIZATION: ICD-10-CM

## 2024-12-31 DIAGNOSIS — N39.0 URINARY TRACT INFECTION WITH HEMATURIA, SITE UNSPECIFIED: ICD-10-CM

## 2024-12-31 DIAGNOSIS — R31.9 URINARY TRACT INFECTION WITH HEMATURIA, SITE UNSPECIFIED: ICD-10-CM

## 2024-12-31 PROBLEM — Z20.2 STD EXPOSURE: Status: RESOLVED | Noted: 2021-06-07 | Resolved: 2024-12-31

## 2024-12-31 LAB
BILIRUB BLD-MCNC: NEGATIVE MG/DL
CLARITY, POC: CLEAR
COLOR UR: ABNORMAL
EXPIRATION DATE: ABNORMAL
GLUCOSE UR STRIP-MCNC: NEGATIVE MG/DL
KETONES UR QL: NEGATIVE
LEUKOCYTE EST, POC: ABNORMAL
Lab: ABNORMAL
NITRITE UR-MCNC: POSITIVE MG/ML
PH UR: 6 [PH] (ref 5–8)
PROT UR STRIP-MCNC: NEGATIVE MG/DL
RBC # UR STRIP: ABNORMAL /UL
SP GR UR: 1.01 (ref 1–1.03)
UROBILINOGEN UR QL: NORMAL

## 2024-12-31 PROCEDURE — 81003 URINALYSIS AUTO W/O SCOPE: CPT | Performed by: STUDENT IN AN ORGANIZED HEALTH CARE EDUCATION/TRAINING PROGRAM

## 2024-12-31 PROCEDURE — 90656 IIV3 VACC NO PRSV 0.5 ML IM: CPT | Performed by: STUDENT IN AN ORGANIZED HEALTH CARE EDUCATION/TRAINING PROGRAM

## 2024-12-31 PROCEDURE — 90471 IMMUNIZATION ADMIN: CPT | Performed by: STUDENT IN AN ORGANIZED HEALTH CARE EDUCATION/TRAINING PROGRAM

## 2024-12-31 PROCEDURE — 99213 OFFICE O/P EST LOW 20 MIN: CPT | Performed by: STUDENT IN AN ORGANIZED HEALTH CARE EDUCATION/TRAINING PROGRAM

## 2024-12-31 RX ORDER — SULFAMETHOXAZOLE AND TRIMETHOPRIM 800; 160 MG/1; MG/1
1 TABLET ORAL 2 TIMES DAILY
Qty: 10 TABLET | Refills: 0 | Status: SHIPPED | OUTPATIENT
Start: 2024-12-31

## 2024-12-31 RX ORDER — SULFAMETHOXAZOLE AND TRIMETHOPRIM 800; 160 MG/1; MG/1
1 TABLET ORAL 2 TIMES DAILY
Qty: 10 TABLET | Refills: 0 | Status: SHIPPED | OUTPATIENT
Start: 2024-12-31 | End: 2024-12-31

## 2024-12-31 NOTE — PROGRESS NOTES
"Chief Complaint  Difficulty Urinating    Subjective        Veronica Welsh presents to [facility]  Difficulty Urinating         Feels she may have UTI.  Feeling bloating and pelvic pain/discomfort yesterday. Is at the end of her cycle so thought period related.  Last night peed and was painful and had some blood. No pain or blood this morning. Did take azo last night.   No fevers or chills. No vaginal discharge.   Had UTI in 12/3/24. Prior had one in May 2024.   Did pelvic floor PT but cost is high. Has been doing home therapy.     Objective   Vital Signs:  /76 (BP Location: Left arm, Patient Position: Sitting, Cuff Size: Adult)   Pulse 110   Temp 97.8 °F (36.6 °C)   Ht 160 cm (63\")   Wt 49 kg (108 lb)   SpO2 99%   BMI 19.13 kg/m²   Estimated body mass index is 19.13 kg/m² as calculated from the following:    Height as of this encounter: 160 cm (63\").    Weight as of this encounter: 49 kg (108 lb).    BMI is within normal parameters. No other follow-up for BMI required.      Physical Exam  Vitals and nursing note reviewed.   Constitutional:       General: She is not in acute distress.     Appearance: Normal appearance. She is not ill-appearing.   HENT:      Head: Normocephalic and atraumatic.      Nose: Nose normal.      Mouth/Throat:      Mouth: Mucous membranes are moist.   Eyes:      Extraocular Movements: Extraocular movements intact.      Conjunctiva/sclera: Conjunctivae normal.   Cardiovascular:      Rate and Rhythm: Normal rate and regular rhythm.      Heart sounds: Normal heart sounds. No murmur heard.     No gallop.   Pulmonary:      Effort: Pulmonary effort is normal. No respiratory distress.      Breath sounds: Normal breath sounds. No stridor. No wheezing, rhonchi or rales.   Chest:      Chest wall: No tenderness.   Abdominal:      Comments: Mild suprapubic discomfort    Skin:     General: Skin is warm and dry.   Neurological:      General: No focal deficit " present.      Mental Status: She is alert and oriented to person, place, and time. Mental status is at baseline.   Psychiatric:         Mood and Affect: Mood normal.         Behavior: Behavior normal.        Result Review :                Assessment and Plan   Diagnoses and all orders for this visit:    1. Pain with urination (Primary)  -     POCT urinalysis dipstick, automated  -     Urine Culture - Urine, Urine, Clean Catch  -     Discontinue: sulfamethoxazole-trimethoprim (Bactrim DS) 800-160 MG per tablet; Take 1 tablet by mouth 2 (Two) Times a Day.  Dispense: 10 tablet; Refill: 0  -     sulfamethoxazole-trimethoprim (Bactrim DS) 800-160 MG per tablet; Take 1 tablet by mouth 2 (Two) Times a Day.  Dispense: 10 tablet; Refill: 0    2. Urinary tract infection with hematuria, site unspecified  -     Discontinue: sulfamethoxazole-trimethoprim (Bactrim DS) 800-160 MG per tablet; Take 1 tablet by mouth 2 (Two) Times a Day.  Dispense: 10 tablet; Refill: 0  -     sulfamethoxazole-trimethoprim (Bactrim DS) 800-160 MG per tablet; Take 1 tablet by mouth 2 (Two) Times a Day.  Dispense: 10 tablet; Refill: 0    3. Encounter for immunization  -     Fluzone >6mos ([id number])      Treat w bactrim  Culture pending        Follow Up   Return in about 4 weeks (around 1/28/2025) for repeat UA .  Patient was given instructions and counseling regarding her condition or for health maintenance advice. Please see specific information pulled into the AVS if appropriate.

## 2025-01-03 ENCOUNTER — TELEPHONE (OUTPATIENT)
Dept: FAMILY MEDICINE CLINIC | Facility: CLINIC | Age: 30
End: 2025-01-03

## 2025-01-03 LAB
BACTERIA UR CULT: ABNORMAL
OTHER ANTIBIOTIC SUSC ISLT: ABNORMAL

## 2025-01-03 NOTE — TELEPHONE ENCOUNTER
Caller: Veronica Welsh    Relationship to patient: Self    Best call back number:     Patient is needing: PATIENT STATES SHE IS AL,MOST DONE WITH THE MEDICATION TO TREAT HER UTI; HOWEVER, SHE CONTINUES WITH UTI SYMPTOMS, AND WOULD LIKE TO KNOW WHAT STEPS TO TAKE.

## 2025-01-07 ENCOUNTER — TELEPHONE (OUTPATIENT)
Dept: FAMILY MEDICINE CLINIC | Facility: CLINIC | Age: 30
End: 2025-01-07
Payer: COMMERCIAL

## 2025-01-07 NOTE — TELEPHONE ENCOUNTER
OK for Hub to relay    Dr. Ch would like to see patient for a repeat UA around 1/28/25 per 12/31/24 disposition notes. LMTCB

## 2025-01-08 ENCOUNTER — TELEPHONE (OUTPATIENT)
Dept: FAMILY MEDICINE CLINIC | Facility: CLINIC | Age: 30
End: 2025-01-08

## 2025-01-08 NOTE — TELEPHONE ENCOUNTER
Caller: Veronica Welsh    Relationship: Self    Best call back number: [phone]     What medication are you requesting: CONTINUED TREATMENT FOR UTI    What are your current symptoms: STILL HAVING BURNING WITH URINATION EVEN THOUGH SHE HAS FINISHED THE ANTIBIOTICS PRESCRIBED      Have you had these symptoms before:    [x] Yes  [] No    Have you been treated for these symptoms before:   [x] Yes  [] No    If a prescription is needed, what is your preferred pharmacy and phone number: [facility] [id number] - [place], [place] - [address] AT [facility] - [phone] [facility] [phone] FX     Additional notes:  PLEASE CALL TO ADVISE

## 2025-01-09 DIAGNOSIS — N39.0 URINARY TRACT INFECTION WITH HEMATURIA, SITE UNSPECIFIED: ICD-10-CM

## 2025-01-09 DIAGNOSIS — R31.9 URINARY TRACT INFECTION WITH HEMATURIA, SITE UNSPECIFIED: ICD-10-CM

## 2025-01-09 DIAGNOSIS — R30.9 PAIN WITH URINATION: ICD-10-CM

## 2025-01-09 RX ORDER — SULFAMETHOXAZOLE AND TRIMETHOPRIM 800; 160 MG/1; MG/1
1 TABLET ORAL 2 TIMES DAILY
Qty: 6 TABLET | Refills: 0 | Status: SHIPPED | OUTPATIENT
Start: 2025-01-09

## 2025-01-09 NOTE — TELEPHONE ENCOUNTER
Returned call. Took antibiotics. Symptoms improved. Better than before but some lingering issues. Not taking any medications like azo and pyridium for discomfort.

## 2025-01-14 ENCOUNTER — OFFICE VISIT (OUTPATIENT)
Dept: OBSTETRICS AND GYNECOLOGY | Facility: CLINIC | Age: 30
End: 2025-01-14
Payer: COMMERCIAL

## 2025-01-14 VITALS
WEIGHT: 110 LBS | DIASTOLIC BLOOD PRESSURE: 84 MMHG | SYSTOLIC BLOOD PRESSURE: 120 MMHG | HEIGHT: 63 IN | BODY MASS INDEX: 19.49 KG/M2

## 2025-01-14 DIAGNOSIS — N89.8 VAGINAL DRYNESS: Primary | ICD-10-CM

## 2025-01-14 DIAGNOSIS — N94.10 DYSPAREUNIA, FEMALE: ICD-10-CM

## 2025-01-14 RX ORDER — ESTRADIOL 0.1 MG/G
2 CREAM VAGINAL DAILY
Qty: 42.5 G | Refills: 0 | Status: SHIPPED | OUTPATIENT
Start: 2025-01-14 | End: 2025-01-24

## 2025-01-14 NOTE — PROGRESS NOTES
"Chief Complaint   Patient presents with    Follow-up     Pt here today due to vaginal dryness and pain with intercourse      SUBJECTIVE:     Veronica Welsh is a 29 y.o.  who presents with c/o vaginal dryness and pain with IC. She has long hx of this issue, but feels there is a new pain on the right side of vagina for the last 1-2 months. She has been completing PFPT, however this has become to expensive for her to continue in person. She has been using a pelvic wand and continues routine exercises. She is using nuvaring and feels that her ring is lower than is should be in vagina and is concerned nuvaring could be causing a pinching of vaginal tissues as a result. Denies new sexual partners, denies postcoital bleeding. Denies vaginal discharge or concerns for STI. LMP 24    Hx interstitial cystis. In the past she has seen urology, but fel they were not treating her problem as she was \"only getting antibiotics\" when she would see them. I did refer her to uro/gyn last year, however she was seen by a midwife instead. Upon exam her bladder is very distended. On prior u/s in  this was noted as well. She last voided today when she first woke up today. Reports she rarely has the urge to void. 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 Reports in high school she was prohibited from using the bathroom throughout the school day and feels this is when urinary symptoms began.     Past Medical History:   Diagnosis Date    Acne     IC (interstitial cystitis)       Past Surgical History:   Procedure Laterality Date    TONSILLECTOMY          Review of Systems   Constitutional:  Negative for chills, fatigue and fever.   Gastrointestinal:  Negative for abdominal distention and abdominal pain.   Genitourinary:  Positive for difficulty urinating and dyspareunia. Negative for dysuria, frequency, menstrual problem, pelvic pain, urgency, vaginal bleeding, vaginal " "discharge and vaginal pain.       OBJECTIVE:   Vitals:    01/14/25 1200   BP: 120/84   Weight: 49.9 kg (110 lb)   Height: 160 cm (63\")        Physical Exam  Constitutional:       General: She is not in acute distress.     Appearance: Normal appearance. She is not ill-appearing, toxic-appearing or diaphoretic.   Genitourinary:      Bladder and urethral meatus normal.      No lesions in the vagina.      Right Labia: No rash, tenderness, lesions, skin changes or Bartholin's cyst.     Left Labia: No tenderness, lesions, skin changes, Bartholin's cyst or rash.     No labial fusion noted.      No inguinal adenopathy present in the right or left side.     No vaginal discharge, erythema, tenderness, bleeding, ulceration or granulation tissue.      No vaginal prolapse present.     No vaginal atrophy present.     Vaginal exam comments: Bladder is distended and can be palpated low in vagina. Nuvaring is present and sitting low in vagina. I am not able to push nuvaring up to cervix.        Right Adnexa: not tender, not full, not palpable, no mass present and not absent.     Left Adnexa: not tender, not full, not palpable, no mass present and not absent.     No cervical motion tenderness, discharge, friability, lesion, polyp, nabothian cyst or eversion.      Uterus is not enlarged, fixed, tender, irregular or prolapsed.      No uterine mass detected.  Abdominal:      General: There is no distension.      Palpations: Abdomen is soft. There is no mass.      Tenderness: There is no abdominal tenderness. There is no guarding.      Hernia: No hernia is present. There is no hernia in the left inguinal area or right inguinal area.   Musculoskeletal:         General: Normal range of motion.   Lymphadenopathy:      Lower Body: No right inguinal adenopathy. No left inguinal adenopathy.   Neurological:      General: No focal deficit present.      Mental Status: She is alert and oriented to person, place, and time.   Skin:     General: Skin " is warm and dry.   Psychiatric:         Mood and Affect: Mood normal.         Behavior: Behavior normal.         Thought Content: Thought content normal.         Judgment: Judgment normal.   Vitals and nursing note reviewed.       Assessment/Plan    Diagnoses and all orders for this visit:    1. Vaginal dryness (Primary)    2. Dyspareunia, female  -     estradiol (ESTRACE VAGINAL) 0.1 MG/GM vaginal cream; Insert 2 g into the vagina Daily for 10 days.  Dispense: 42.5 g; Refill: 0  -     US Non-ob Transvaginal; Future    Several year hx vaginal dryness and pain with IC, however she has new pain on right side. No evidence of trauma, erythema, no discharge noted.   Bladder is distended and can be palpated low in vagina. Nuvaring is present and sitting low in vagina. I am not able to push nuvaring up to cervix. I did have the pt void and return back for pelvic exam, there was some improvement, but not a significant amount. She reports she only voided a small amount. Suspect this is a contributing factor for her painful IC as well.   Recommend starting with repeat TVUS to R/O gyn causes. If we continue to see distended bladder will refer to uro/gyn or urology.  Continue PFPT at home, discussed use of vaginal dilators as well. Continue lubricants with IC. Adding short course of vaginal estrace to see if this will help dryness and pain.  Encouraged increased hydration with water and timed voiding every 2-2.5 hours on a schedule    Return in about 2 weeks (around 1/28/2025) for Ultrasound, Follow up, SAFIA Hodo.    I spent 45 minutes caring for Veronica on this date of service. This time includes time spent by me in the following activities: preparing for the visit, reviewing tests, performing a medically appropriate examination and/or evaluation, counseling and educating the patient/family/caregiver, referring and communicating with other health care professionals, documenting information in the medical record,  independently interpreting results and communicating that information with the patient/family/caregiver, care coordination, ordering test(s), obtaining a separately obtained history, and reviewing a separately obtained history    SAFIA Amador  1/14/2025  13:11 EST

## 2025-01-28 ENCOUNTER — OFFICE VISIT (OUTPATIENT)
Dept: OBSTETRICS AND GYNECOLOGY | Facility: CLINIC | Age: 30
End: 2025-01-28
Payer: COMMERCIAL

## 2025-01-28 VITALS
BODY MASS INDEX: 19.49 KG/M2 | WEIGHT: 110 LBS | DIASTOLIC BLOOD PRESSURE: 76 MMHG | SYSTOLIC BLOOD PRESSURE: 112 MMHG | HEIGHT: 63 IN

## 2025-01-28 DIAGNOSIS — N30.10 INTERSTITIAL CYSTITIS: ICD-10-CM

## 2025-01-28 DIAGNOSIS — R10.2 VAGINAL PAIN: ICD-10-CM

## 2025-01-28 DIAGNOSIS — N94.10 DYSPAREUNIA, FEMALE: Primary | ICD-10-CM

## 2025-01-28 DIAGNOSIS — N32.89 DISTENDED BLADDER: ICD-10-CM

## 2025-01-28 NOTE — PROGRESS NOTES
"Chief Complaint   Patient presents with    Follow-up     Pt here today to discuss u/s        SUBJECTIVE:     Veronica Welsh is a 29 y.o.  who presents for f/u. TVUS completed today. At last visit she was found to have distended bladder and reported not voiding very often, does not get sensation to void. She has been urinating on a timed schedule since her last visit. She has long hx painful IC and completes PFPT at home routinely. She c/o painful area on right side of vaginal opening that is intermittent, was not present at last visit, but is there today. She was prescribed estrace, but has not started this yet. Long hx frequent UTI and interstitial cystitis. She has been seen by urology, but has been unhappy with care there.    Past Medical History:   Diagnosis Date    Acne     IC (interstitial cystitis)       Past Surgical History:   Procedure Laterality Date    TONSILLECTOMY          Review of Systems   Constitutional:  Negative for chills, fatigue and fever.   Gastrointestinal:  Negative for abdominal distention and abdominal pain.   Genitourinary:  Positive for difficulty urinating and dyspareunia.       OBJECTIVE:   Vitals:    25 1024   BP: 112/76   Weight: 49.9 kg (110 lb)   Height: 160 cm (63\")        Physical Exam  Constitutional:       General: She is not in acute distress.     Appearance: Normal appearance. She is not ill-appearing, toxic-appearing or diaphoretic.   Genitourinary:      Bladder and urethral meatus normal.      No lesions in the vagina.      Right Labia: No rash, tenderness, lesions, skin changes or Bartholin's cyst.     Left Labia: No tenderness, lesions, skin changes, Bartholin's cyst or rash.     No labial fusion noted.      No inguinal adenopathy present in the right or left side.     Vaginal ulceration (right introitus) present.      No vaginal discharge, erythema, tenderness, bleeding or granulation tissue.      No vaginal prolapse present.     No vaginal atrophy " present.       Right Adnexa: not tender, not full, not palpable, no mass present and not absent.     Left Adnexa: not tender, not full, not palpable, no mass present and not absent.     No cervical motion tenderness, discharge, friability, lesion, polyp, nabothian cyst or eversion.      Uterus is not enlarged, fixed, tender, irregular or prolapsed.      No uterine mass detected.  Cardiovascular:      Rate and Rhythm: Normal rate.   Pulmonary:      Effort: Pulmonary effort is normal.   Abdominal:      General: There is no distension.      Palpations: Abdomen is soft. There is no mass.      Tenderness: There is no abdominal tenderness. There is no guarding.      Hernia: No hernia is present. There is no hernia in the left inguinal area or right inguinal area.   Musculoskeletal:         General: Normal range of motion.      Cervical back: Normal range of motion.   Lymphadenopathy:      Lower Body: No right inguinal adenopathy. No left inguinal adenopathy.   Neurological:      General: No focal deficit present.      Mental Status: She is alert and oriented to person, place, and time.   Skin:     General: Skin is warm and dry.   Psychiatric:         Mood and Affect: Mood normal.         Behavior: Behavior normal.         Thought Content: Thought content normal.         Judgment: Judgment normal.   Vitals and nursing note reviewed.       Assessment/Plan    Diagnoses and all orders for this visit:    1. Dyspareunia, female (Primary)    2. Vaginal pain    3. Interstitial cystitis  -     Ambulatory Referral to Gynecologic Urology    4. Distended bladder  -     Ambulatory Referral to Gynecologic Urology    TVUS completed today and normal-bladder not distended on TVUS today  Continue timed voids. She would like to see uro/gyn-referral entered  Continue PFPT-she has seen improvement with exercises   Discussed vaginal estrace  Small ulceration right introitus. Not c/w HSV, possible trauma    Return if symptoms worsen or fail to  improve.    I spent 30 minutes caring for Veronica on this date of service. This time includes time spent by me in the following activities: preparing for the visit, reviewing tests, performing a medically appropriate examination and/or evaluation, counseling and educating the patient/family/caregiver, referring and communicating with other health care professionals, documenting information in the medical record, independently interpreting results and communicating that information with the patient/family/caregiver, care coordination, ordering medications, ordering test(s), obtaining a separately obtained history, and reviewing a separately obtained history    Arline Lowe, APRN  1/28/2025  13:52 EST

## 2025-02-11 ENCOUNTER — TELEPHONE (OUTPATIENT)
Dept: FAMILY MEDICINE CLINIC | Facility: CLINIC | Age: 30
End: 2025-02-11

## 2025-02-11 DIAGNOSIS — T78.2XXA ANAPHYLAXIS, INITIAL ENCOUNTER: Primary | ICD-10-CM

## 2025-02-11 NOTE — TELEPHONE ENCOUNTER
Caller: Veronica Welsh    Relationship: Self    Best call back number: [phone]    What orders are you requesting (i.e. lab or imaging): ALLERGIST    In what timeframe would the patient need to come in: ASAP    Where will you receive your lab/imaging services: UNKNOWN    Additional notes: PATIENTS LIPS ARE SWELLING AND DIFFICULTY BREATHING.    PATIENT TOOK BENADRYL AND IT HAS HELPED.  SWELLING HAS GONE DOWN AND ABLE TO BREATHE AGAIN.    PATIENT DOESN'T KNOW WHAT CAUSED IT.

## 2025-04-29 ENCOUNTER — OFFICE VISIT (OUTPATIENT)
Dept: OBSTETRICS AND GYNECOLOGY | Facility: CLINIC | Age: 30
End: 2025-04-29
Payer: COMMERCIAL

## 2025-04-29 VITALS
DIASTOLIC BLOOD PRESSURE: 73 MMHG | WEIGHT: 110 LBS | BODY MASS INDEX: 19.49 KG/M2 | HEIGHT: 63 IN | SYSTOLIC BLOOD PRESSURE: 104 MMHG

## 2025-04-29 DIAGNOSIS — Z01.419 WOMEN'S ANNUAL ROUTINE GYNECOLOGICAL EXAMINATION: Primary | ICD-10-CM

## 2025-04-29 DIAGNOSIS — Z30.49 ENCOUNTER FOR SURVEILLANCE OF OTHER CONTRACEPTIVE: ICD-10-CM

## 2025-04-29 RX ORDER — NORETHINDRONE ACETATE AND ETHINYL ESTRADIOL .02; 1 MG/1; MG/1
1 TABLET ORAL DAILY
Qty: 84 TABLET | Refills: 3 | Status: SHIPPED | OUTPATIENT
Start: 2025-04-29

## 2025-04-29 NOTE — PROGRESS NOTES
GYN Annual Exam     Chief Complaint   Patient presents with    Gynecologic Exam     Pt here today for AE, pt would like to discuss different BC options     HPI    Veronica Welsh is a 29 y.o. female who presents for annual well woman exam.  She is sexually active. Periods are regular every 28-30 days, lasting 5 days. Dysmenorrhea:moderate, occurring throughout menses. No intermenstrual bleeding, spotting, or discharge. Performing SBE:completes. Currently using NuvaRing for contraception and would like to discuss other options. She thinks ring is irritating vaginal walls.  Denies history of migraine with aura, denies history of DVT, there is no family history of DVT. She is a nonsmoker. Previously JANA caused decreased libido and vaginal dryness. She would like to try a pill again.     Currently on antibiotics for UTI. Hx frequent UTI and interstitial cystitis. Seeing uro/gyn, last seen in March and has f/u scheduled in . Hx painful IC, she reports resolution as long as she continues PFPT  OB History          0    Para   0    Term   0       0    AB   0    Living   0         SAB   0    IAB   0    Ectopic   0    Molar   0    Multiple   0    Live Births   0              LMP- 25  Current contraception: NuvaRing vaginal inserts  Last Pap-  NIL  History of abnormal Pap smear: no  History of STD-denies  Family history of uterine, colon or ovarian cancer: no  Family history of breast cancer: no  Gardasil Vaccine: completed    Past Medical History:   Diagnosis Date    Acne     IC (interstitial cystitis)        Past Surgical History:   Procedure Laterality Date    TONSILLECTOMY           Current Outpatient Medications:     Ashwagandha 125 MG capsule, Take  by mouth., Disp: , Rfl:     Cetirizine HCl (ZYRTEC ALLERGY PO), Take  by mouth As Needed., Disp: , Rfl:     nitrofurantoin, macrocrystal-monohydrate, (MACROBID) 100 MG capsule, Take 1 capsule by mouth 2 (Two) Times a Day for 7 days., Disp: 14 capsule,  "Rfl: 0    NON FORMULARY, Estrogen Cream, Disp: , Rfl:     phenazopyridine (PYRIDIUM) 200 MG tablet, Take 1 tablet by mouth 3 (Three) Times a Day As Needed for Bladder Spasms or Dysuria., Disp: 6 tablet, Rfl: 0    norethindrone-ethinyl estradiol (Loestrin 1/20, 21,) 1-20 MG-MCG per tablet, Take 1 tablet by mouth Daily., Disp: 84 tablet, Rfl: 3    Allergies   Allergen Reactions    Amoxicillin Hives       Social History     Tobacco Use    Smoking status: Never     Passive exposure: Never    Smokeless tobacco: Never   Vaping Use    Vaping status: Never Used   Substance Use Topics    Alcohol use: No    Drug use: No       Family History   Problem Relation Age of Onset    No Known Problems Father     Hyperlipidemia Mother     Hyperlipidemia Maternal Grandfather        Review of Systems   Constitutional:  Negative for chills, fatigue and fever.   Gastrointestinal:  Negative for abdominal distention, abdominal pain, nausea and vomiting.   Genitourinary:  Negative for breast discharge, breast lump, breast pain, dyspareunia, dysuria, frequency, menstrual problem, pelvic pain, pelvic pressure, urgency, vaginal bleeding, vaginal discharge and vaginal pain.   Musculoskeletal:  Negative for gait problem.   Skin:  Negative for rash.   Neurological:  Negative for dizziness and headache.   Psychiatric/Behavioral:  Negative for behavioral problems.        /73   Ht 160 cm (63\")   Wt 49.9 kg (110 lb)   LMP 04/18/2025   BMI 19.49 kg/m²     Physical Exam  Constitutional:       General: She is not in acute distress.     Appearance: Normal appearance. She is not ill-appearing, toxic-appearing or diaphoretic.   Genitourinary:      Vulva and urethral meatus normal.      No lesions in the vagina.      Right Labia: No rash, tenderness, lesions, skin changes or Bartholin's cyst.     Left Labia: No tenderness, lesions, skin changes, Bartholin's cyst or rash.     No labial fusion noted.      No inguinal adenopathy present in the right or " left side.     No vaginal discharge, erythema, tenderness, bleeding or ulceration.      No vaginal prolapse present.     No vaginal atrophy present.       Right Adnexa: not tender, not full, not palpable, no mass present and not absent.     Left Adnexa: not tender, not full, not palpable, no mass present and not absent.     No cervical motion tenderness, discharge, friability, lesion, polyp, nabothian cyst or eversion.      Uterus is not enlarged, fixed, tender, irregular or prolapsed.      No uterine mass detected.     No urethral tenderness or mass present.      Bladder exam comments: distended.      Pelvic exam was performed with patient in the lithotomy position.   Breasts:     Breasts are symmetrical.      Right: Present. No swelling, bleeding, inverted nipple, mass, nipple discharge, skin change, tenderness or breast implant.      Left: Present. No swelling, bleeding, inverted nipple, mass, nipple discharge, skin change, tenderness or breast implant.   HENT:      Head: Normocephalic and atraumatic.   Eyes:      Pupils: Pupils are equal, round, and reactive to light.   Cardiovascular:      Rate and Rhythm: Normal rate.   Pulmonary:      Effort: Pulmonary effort is normal.   Abdominal:      General: There is no distension.      Palpations: Abdomen is soft. There is no mass.      Tenderness: There is no abdominal tenderness. There is no guarding.      Hernia: No hernia is present. There is no hernia in the left inguinal area or right inguinal area.   Musculoskeletal:         General: Normal range of motion.      Cervical back: Normal range of motion and neck supple. No tenderness.   Lymphadenopathy:      Cervical: No cervical adenopathy.      Upper Body:      Right upper body: No supraclavicular, axillary or pectoral adenopathy.      Left upper body: No supraclavicular, axillary or pectoral adenopathy.      Lower Body: No right inguinal adenopathy. No left inguinal adenopathy.   Neurological:      General: No  focal deficit present.      Mental Status: She is alert and oriented to person, place, and time.      Cranial Nerves: No cranial nerve deficit.   Skin:     General: Skin is warm and dry.   Psychiatric:         Mood and Affect: Mood normal.         Behavior: Behavior normal.         Thought Content: Thought content normal.         Judgment: Judgment normal.   Vitals and nursing note reviewed.       Assessment   Diagnoses and all orders for this visit:    1. Women's annual routine gynecological examination (Primary)    2. Encounter for surveillance of other contraceptive  -     norethindrone-ethinyl estradiol (Loestrin 1/20, 21,) 1-20 MG-MCG per tablet; Take 1 tablet by mouth Daily.  Dispense: 84 tablet; Refill: 3       Plan   Well woman exam: Pap smear up to date. Recommend MVI daily.    Contraception: Changing to JANA. Discussed start up  STD: Enc condoms. Desires STD screen today- No, declines  Smoking status: nonsmoker   Encouraged annual mammogram screening starting at age 40. Instructed on how to perform SBE. Encouraged breast health self awareness.  6.    Encouraged 150 minutes of exercise per week if not medially contraindicated.   7.    BMI is within normal parameters. No other follow-up for BMI required.  8.    Bladder distended today. This has been seen in the past. Encouraged to discuss with uro/gyn at f/u. She declined repeat cystoscopy  in March, I eng her to discuss with urogyn hx pain with procedure to see if there is an option to complete with pain medication or anesthesia. She is voiding on a schedule, but reports starting new job and completing finals which have interfered with routine recently.     Return in about 1 year (around 4/29/2026) for Annual physical, SAFIA Hood.    SAFIA Amador  4/29/2025  11:22 EDT

## 2025-05-14 NOTE — TELEPHONE ENCOUNTER
Pt aware. Still concerned of urine color, says she will monitor over weekend and update us. If symptoms do not improve or worsen, states she will come in to leave urine.    [place]   
Pt is calling about Provera that she states she began taking Tuesday night. She has questions regarding side effects, wanting to know if it is normal for urine to be orange and states when urinating she feels as if she is not fully emptying bladder.    Please advise,   Thank you   
This is not a typical side effect of provera. She did have negative urine culture 1/26/23. However, she should return to the office to leave a urine sample if this does not improve or if is worsens. Thank you 
no